# Patient Record
Sex: FEMALE | Race: WHITE | Employment: FULL TIME | URBAN - METROPOLITAN AREA
[De-identification: names, ages, dates, MRNs, and addresses within clinical notes are randomized per-mention and may not be internally consistent; named-entity substitution may affect disease eponyms.]

---

## 2017-05-09 ENCOUNTER — EMPLOYEE WELLNESS (OUTPATIENT)
Dept: OTHER | Age: 47
End: 2017-05-09

## 2017-05-09 LAB
CHOLESTEROL, TOTAL: 207 MG/DL (ref 0–199)
GLUCOSE BLD-MCNC: 96 MG/DL (ref 74–107)
HDLC SERPL-MCNC: 47 MG/DL
LDL CHOLESTEROL CALCULATED: 133 MG/DL (ref 0–99)
TRIGL SERPL-MCNC: 135 MG/DL (ref 0–149)

## 2018-03-20 VITALS — WEIGHT: 175 LBS | BODY MASS INDEX: 28.25 KG/M2

## 2018-06-08 RX ORDER — SUMATRIPTAN 100 MG/1
100 TABLET, FILM COATED ORAL EVERY 12 HOURS PRN
Qty: 9 TABLET | Refills: 0 | Status: SHIPPED | OUTPATIENT
Start: 2018-06-08

## 2018-06-08 RX ORDER — SUMATRIPTAN 100 MG/1
100 TABLET, FILM COATED ORAL
COMMUNITY
Start: 2016-05-03 | End: 2018-06-08 | Stop reason: SDUPTHER

## 2018-07-03 DIAGNOSIS — R51.9 CHRONIC NONINTRACTABLE HEADACHE, UNSPECIFIED HEADACHE TYPE: Primary | ICD-10-CM

## 2018-07-03 DIAGNOSIS — G89.29 CHRONIC NONINTRACTABLE HEADACHE, UNSPECIFIED HEADACHE TYPE: Primary | ICD-10-CM

## 2018-07-06 ENCOUNTER — HOSPITAL ENCOUNTER (OUTPATIENT)
Age: 48
Discharge: HOME | End: 2018-07-06
Payer: COMMERCIAL

## 2018-07-06 ENCOUNTER — OFFICE VISIT (OUTPATIENT)
Dept: PRIMARY CARE CLINIC | Age: 48
End: 2018-07-06
Payer: COMMERCIAL

## 2018-07-06 VITALS
BODY MASS INDEX: 28.82 KG/M2 | DIASTOLIC BLOOD PRESSURE: 72 MMHG | SYSTOLIC BLOOD PRESSURE: 126 MMHG | WEIGHT: 173 LBS | HEIGHT: 65 IN | OXYGEN SATURATION: 98 % | HEART RATE: 74 BPM | TEMPERATURE: 98.4 F

## 2018-07-06 DIAGNOSIS — G89.29 CHRONIC NONINTRACTABLE HEADACHE, UNSPECIFIED HEADACHE TYPE: ICD-10-CM

## 2018-07-06 DIAGNOSIS — G43.909 MIGRAINE WITHOUT STATUS MIGRAINOSUS, NOT INTRACTABLE, UNSPECIFIED MIGRAINE TYPE: Primary | ICD-10-CM

## 2018-07-06 DIAGNOSIS — R51.9 CHRONIC NONINTRACTABLE HEADACHE, UNSPECIFIED HEADACHE TYPE: ICD-10-CM

## 2018-07-06 DIAGNOSIS — E78.2 MIXED HYPERLIPIDEMIA: ICD-10-CM

## 2018-07-06 PROBLEM — C50.919 PRIMARY MALIGNANT NEOPLASM OF FEMALE BREAST (HCC): Status: ACTIVE | Noted: 2018-07-06

## 2018-07-06 PROBLEM — N94.9 FEMALE GENITAL SYMPTOMS: Status: ACTIVE | Noted: 2018-07-06

## 2018-07-06 LAB
ALBUMIN SERPL-MCNC: 4.5 G/DL (ref 3.5–5.2)
ALP BLD-CCNC: 59 U/L (ref 35–104)
ALT SERPL-CCNC: 14 U/L (ref 0–32)
ANION GAP SERPL CALCULATED.3IONS-SCNC: 10 MMOL/L (ref 7–16)
AST SERPL-CCNC: 15 U/L (ref 0–31)
BASOPHILS ABSOLUTE: 0.04 E9/L (ref 0–0.2)
BASOPHILS RELATIVE PERCENT: 0.8 % (ref 0–2)
BILIRUB SERPL-MCNC: 0.5 MG/DL (ref 0–1.2)
BUN BLDV-MCNC: 18 MG/DL (ref 6–20)
CALCIUM SERPL-MCNC: 9.5 MG/DL (ref 8.6–10.2)
CHLORIDE BLD-SCNC: 102 MMOL/L (ref 98–107)
CHOLESTEROL, TOTAL: 209 MG/DL (ref 0–199)
CO2: 27 MMOL/L (ref 22–29)
CREAT SERPL-MCNC: 0.9 MG/DL (ref 0.5–1)
EOSINOPHILS ABSOLUTE: 0.12 E9/L (ref 0.05–0.5)
EOSINOPHILS RELATIVE PERCENT: 2.3 % (ref 0–6)
GFR AFRICAN AMERICAN: >60
GFR NON-AFRICAN AMERICAN: >60 ML/MIN/1.73
GLUCOSE BLD-MCNC: 101 MG/DL (ref 74–109)
HCT VFR BLD CALC: 42.9 % (ref 34–48)
HDLC SERPL-MCNC: 48 MG/DL
HEMOGLOBIN: 14.5 G/DL (ref 11.5–15.5)
IMMATURE GRANULOCYTES #: 0.01 E9/L
IMMATURE GRANULOCYTES %: 0.2 % (ref 0–5)
LDL CHOLESTEROL CALCULATED: 130 MG/DL (ref 0–99)
LYMPHOCYTES ABSOLUTE: 1.42 E9/L (ref 1.5–4)
LYMPHOCYTES RELATIVE PERCENT: 27.5 % (ref 20–42)
MCH RBC QN AUTO: 30.5 PG (ref 26–35)
MCHC RBC AUTO-ENTMCNC: 33.8 % (ref 32–34.5)
MCV RBC AUTO: 90.1 FL (ref 80–99.9)
MONOCYTES ABSOLUTE: 0.38 E9/L (ref 0.1–0.95)
MONOCYTES RELATIVE PERCENT: 7.4 % (ref 2–12)
NEUTROPHILS ABSOLUTE: 3.2 E9/L (ref 1.8–7.3)
NEUTROPHILS RELATIVE PERCENT: 61.8 % (ref 43–80)
PDW BLD-RTO: 11.9 FL (ref 11.5–15)
PLATELET # BLD: 192 E9/L (ref 130–450)
PMV BLD AUTO: 11.7 FL (ref 7–12)
POTASSIUM SERPL-SCNC: 4.2 MMOL/L (ref 3.5–5)
RBC # BLD: 4.76 E12/L (ref 3.5–5.5)
SODIUM BLD-SCNC: 139 MMOL/L (ref 132–146)
TOTAL PROTEIN: 7.1 G/DL (ref 6.4–8.3)
TRIGL SERPL-MCNC: 155 MG/DL (ref 0–149)
TSH SERPL DL<=0.05 MIU/L-ACNC: 2.73 UIU/ML (ref 0.27–4.2)
VLDLC SERPL CALC-MCNC: 31 MG/DL
WBC # BLD: 5.2 E9/L (ref 4.5–11.5)

## 2018-07-06 PROCEDURE — 80061 LIPID PANEL: CPT

## 2018-07-06 PROCEDURE — 85025 COMPLETE CBC W/AUTO DIFF WBC: CPT

## 2018-07-06 PROCEDURE — 99213 OFFICE O/P EST LOW 20 MIN: CPT | Performed by: FAMILY MEDICINE

## 2018-07-06 PROCEDURE — 36415 COLL VENOUS BLD VENIPUNCTURE: CPT

## 2018-07-06 PROCEDURE — 80053 COMPREHEN METABOLIC PANEL: CPT

## 2018-07-06 PROCEDURE — 84443 ASSAY THYROID STIM HORMONE: CPT

## 2018-07-06 RX ORDER — ATORVASTATIN CALCIUM 10 MG
10 TABLET ORAL DAILY
Qty: 30 TABLET | Refills: 3 | Status: ON HOLD
Start: 2018-07-06 | End: 2020-11-18 | Stop reason: HOSPADM

## 2018-07-06 ASSESSMENT — PATIENT HEALTH QUESTIONNAIRE - PHQ9
SUM OF ALL RESPONSES TO PHQ QUESTIONS 1-9: 0
1. LITTLE INTEREST OR PLEASURE IN DOING THINGS: 0
SUM OF ALL RESPONSES TO PHQ9 QUESTIONS 1 & 2: 0
2. FEELING DOWN, DEPRESSED OR HOPELESS: 0

## 2018-07-06 ASSESSMENT — ENCOUNTER SYMPTOMS
SHORTNESS OF BREATH: 0
EYE DISCHARGE: 0
BLOOD IN STOOL: 0
HEMOPTYSIS: 0
ORTHOPNEA: 0
ABDOMINAL PAIN: 0
BLURRED VISION: 0
NAUSEA: 0

## 2019-01-25 DIAGNOSIS — Z00.00 WELL ADULT EXAM: Primary | ICD-10-CM

## 2020-11-14 ENCOUNTER — HOSPITAL ENCOUNTER (INPATIENT)
Age: 50
LOS: 5 days | Discharge: HOME | DRG: 177 | End: 2020-11-19
Attending: EMERGENCY MEDICINE | Admitting: INTERNAL MEDICINE
Payer: COMMERCIAL

## 2020-11-14 ENCOUNTER — APPOINTMENT (OUTPATIENT)
Dept: CT IMAGING | Age: 50
DRG: 177 | End: 2020-11-14
Payer: COMMERCIAL

## 2020-11-14 PROBLEM — U07.1 COVID-19: Status: ACTIVE | Noted: 2020-11-14

## 2020-11-14 LAB
ALBUMIN SERPL-MCNC: 4.1 G/DL (ref 3.5–5.2)
ALP BLD-CCNC: 112 U/L (ref 35–104)
ALT SERPL-CCNC: 69 U/L (ref 0–32)
ANION GAP SERPL CALCULATED.3IONS-SCNC: 11 MMOL/L (ref 7–16)
AST SERPL-CCNC: 93 U/L (ref 0–31)
BASOPHILS ABSOLUTE: 0 E9/L (ref 0–0.2)
BASOPHILS RELATIVE PERCENT: 0 % (ref 0–2)
BILIRUB SERPL-MCNC: 0.7 MG/DL (ref 0–1.2)
BUN BLDV-MCNC: 15 MG/DL (ref 6–20)
BURR CELLS: ABNORMAL
CALCIUM SERPL-MCNC: 8.8 MG/DL (ref 8.6–10.2)
CHLORIDE BLD-SCNC: 105 MMOL/L (ref 98–107)
CO2: 24 MMOL/L (ref 22–29)
CREAT SERPL-MCNC: 0.8 MG/DL (ref 0.5–1)
EOSINOPHILS ABSOLUTE: 0 E9/L (ref 0.05–0.5)
EOSINOPHILS RELATIVE PERCENT: 0 % (ref 0–6)
GFR AFRICAN AMERICAN: >60
GFR NON-AFRICAN AMERICAN: >60 ML/MIN/1.73
GLUCOSE BLD-MCNC: 88 MG/DL (ref 74–99)
HCG, URINE, POC: NEGATIVE
HCT VFR BLD CALC: 42.2 % (ref 34–48)
HEMOGLOBIN: 14.1 G/DL (ref 11.5–15.5)
INFLUENZA A BY PCR: NOT DETECTED
INFLUENZA B BY PCR: NOT DETECTED
LACTIC ACID, SEPSIS: 0.7 MMOL/L (ref 0.5–1.9)
LACTIC ACID, SEPSIS: 1.1 MMOL/L (ref 0.5–1.9)
LYMPHOCYTES ABSOLUTE: 0.62 E9/L (ref 1.5–4)
LYMPHOCYTES RELATIVE PERCENT: 23.5 % (ref 20–42)
Lab: NORMAL
MCH RBC QN AUTO: 29.5 PG (ref 26–35)
MCHC RBC AUTO-ENTMCNC: 33.4 % (ref 32–34.5)
MCV RBC AUTO: 88.3 FL (ref 80–99.9)
MONOCYTES ABSOLUTE: 0.16 E9/L (ref 0.1–0.95)
MONOCYTES RELATIVE PERCENT: 6.1 % (ref 2–12)
MYELOCYTE PERCENT: 0.9 % (ref 0–0)
NEGATIVE QC PASS/FAIL: NORMAL
NEUTROPHILS ABSOLUTE: 1.85 E9/L (ref 1.8–7.3)
NEUTROPHILS RELATIVE PERCENT: 69.6 % (ref 43–80)
PDW BLD-RTO: 12.7 FL (ref 11.5–15)
PLATELET # BLD: 148 E9/L (ref 130–450)
PMV BLD AUTO: 11.5 FL (ref 7–12)
POIKILOCYTES: ABNORMAL
POLYCHROMASIA: ABNORMAL
POSITIVE QC PASS/FAIL: NORMAL
POTASSIUM REFLEX MAGNESIUM: 4.5 MMOL/L (ref 3.5–5)
RBC # BLD: 4.78 E12/L (ref 3.5–5.5)
SARS-COV-2, NAAT: DETECTED
SODIUM BLD-SCNC: 140 MMOL/L (ref 132–146)
TEAR DROP CELLS: ABNORMAL
TOTAL PROTEIN: 7.6 G/DL (ref 6.4–8.3)
TROPONIN: <0.01 NG/ML (ref 0–0.03)
WBC # BLD: 2.6 E9/L (ref 4.5–11.5)

## 2020-11-14 PROCEDURE — 80053 COMPREHEN METABOLIC PANEL: CPT

## 2020-11-14 PROCEDURE — 84484 ASSAY OF TROPONIN QUANT: CPT

## 2020-11-14 PROCEDURE — U0002 COVID-19 LAB TEST NON-CDC: HCPCS

## 2020-11-14 PROCEDURE — 6370000000 HC RX 637 (ALT 250 FOR IP): Performed by: EMERGENCY MEDICINE

## 2020-11-14 PROCEDURE — 6360000004 HC RX CONTRAST MEDICATION: Performed by: RADIOLOGY

## 2020-11-14 PROCEDURE — 2500000003 HC RX 250 WO HCPCS: Performed by: SPECIALIST

## 2020-11-14 PROCEDURE — 83605 ASSAY OF LACTIC ACID: CPT

## 2020-11-14 PROCEDURE — 36415 COLL VENOUS BLD VENIPUNCTURE: CPT

## 2020-11-14 PROCEDURE — 82728 ASSAY OF FERRITIN: CPT

## 2020-11-14 PROCEDURE — 87040 BLOOD CULTURE FOR BACTERIA: CPT

## 2020-11-14 PROCEDURE — 2580000003 HC RX 258: Performed by: SPECIALIST

## 2020-11-14 PROCEDURE — 85384 FIBRINOGEN ACTIVITY: CPT

## 2020-11-14 PROCEDURE — XW033E5 INTRODUCTION OF REMDESIVIR ANTI-INFECTIVE INTO PERIPHERAL VEIN, PERCUTANEOUS APPROACH, NEW TECHNOLOGY GROUP 5: ICD-10-PCS | Performed by: INTERNAL MEDICINE

## 2020-11-14 PROCEDURE — 6360000002 HC RX W HCPCS: Performed by: INTERNAL MEDICINE

## 2020-11-14 PROCEDURE — 6360000002 HC RX W HCPCS: Performed by: EMERGENCY MEDICINE

## 2020-11-14 PROCEDURE — 71275 CT ANGIOGRAPHY CHEST: CPT

## 2020-11-14 PROCEDURE — 6360000002 HC RX W HCPCS: Performed by: SPECIALIST

## 2020-11-14 PROCEDURE — 85025 COMPLETE CBC W/AUTO DIFF WBC: CPT

## 2020-11-14 PROCEDURE — 86140 C-REACTIVE PROTEIN: CPT

## 2020-11-14 PROCEDURE — 87502 INFLUENZA DNA AMP PROBE: CPT

## 2020-11-14 PROCEDURE — 84145 PROCALCITONIN (PCT): CPT

## 2020-11-14 PROCEDURE — 1200000000 HC SEMI PRIVATE

## 2020-11-14 PROCEDURE — 85610 PROTHROMBIN TIME: CPT

## 2020-11-14 PROCEDURE — 99284 EMERGENCY DEPT VISIT MOD MDM: CPT

## 2020-11-14 PROCEDURE — 93005 ELECTROCARDIOGRAM TRACING: CPT | Performed by: EMERGENCY MEDICINE

## 2020-11-14 PROCEDURE — 2580000003 HC RX 258: Performed by: EMERGENCY MEDICINE

## 2020-11-14 PROCEDURE — 83615 LACTATE (LD) (LDH) ENZYME: CPT

## 2020-11-14 RX ORDER — DEXAMETHASONE 4 MG/1
4 TABLET ORAL EVERY 6 HOURS SCHEDULED
Status: DISCONTINUED | OUTPATIENT
Start: 2020-11-15 | End: 2020-11-14

## 2020-11-14 RX ORDER — 0.9 % SODIUM CHLORIDE 0.9 %
1000 INTRAVENOUS SOLUTION INTRAVENOUS ONCE
Status: COMPLETED | OUTPATIENT
Start: 2020-11-14 | End: 2020-11-14

## 2020-11-14 RX ORDER — ACETAMINOPHEN 325 MG/1
650 TABLET ORAL EVERY 4 HOURS PRN
Status: DISCONTINUED | OUTPATIENT
Start: 2020-11-14 | End: 2020-11-19 | Stop reason: HOSPADM

## 2020-11-14 RX ORDER — KETOROLAC TROMETHAMINE 30 MG/ML
30 INJECTION, SOLUTION INTRAMUSCULAR; INTRAVENOUS ONCE
Status: COMPLETED | OUTPATIENT
Start: 2020-11-14 | End: 2020-11-14

## 2020-11-14 RX ORDER — DEXAMETHASONE 6 MG/1
6 TABLET ORAL DAILY
Status: DISCONTINUED | OUTPATIENT
Start: 2020-11-15 | End: 2020-11-17 | Stop reason: CLARIF

## 2020-11-14 RX ORDER — DEXTROSE, SODIUM CHLORIDE, AND POTASSIUM CHLORIDE 5; .45; .15 G/100ML; G/100ML; G/100ML
INJECTION INTRAVENOUS CONTINUOUS
Status: DISCONTINUED | OUTPATIENT
Start: 2020-11-14 | End: 2020-11-15

## 2020-11-14 RX ORDER — 0.9 % SODIUM CHLORIDE 0.9 %
30 INTRAVENOUS SOLUTION INTRAVENOUS PRN
Status: DISCONTINUED | OUTPATIENT
Start: 2020-11-14 | End: 2020-11-19 | Stop reason: HOSPADM

## 2020-11-14 RX ORDER — ASPIRIN 81 MG/1
324 TABLET, CHEWABLE ORAL ONCE
Status: COMPLETED | OUTPATIENT
Start: 2020-11-14 | End: 2020-11-14

## 2020-11-14 RX ORDER — ASCORBIC ACID 500 MG
500 TABLET ORAL DAILY
Status: DISCONTINUED | OUTPATIENT
Start: 2020-11-15 | End: 2020-11-19 | Stop reason: HOSPADM

## 2020-11-14 RX ORDER — BUDESONIDE AND FORMOTEROL FUMARATE DIHYDRATE 80; 4.5 UG/1; UG/1
2 AEROSOL RESPIRATORY (INHALATION) 2 TIMES DAILY
Status: DISCONTINUED | OUTPATIENT
Start: 2020-11-14 | End: 2020-11-15 | Stop reason: CLARIF

## 2020-11-14 RX ORDER — ZINC GLUCONATE 50 MG
50 TABLET ORAL DAILY
Status: DISCONTINUED | OUTPATIENT
Start: 2020-11-15 | End: 2020-11-19 | Stop reason: HOSPADM

## 2020-11-14 RX ORDER — ACETAMINOPHEN 500 MG
1000 TABLET ORAL ONCE
Status: COMPLETED | OUTPATIENT
Start: 2020-11-14 | End: 2020-11-14

## 2020-11-14 RX ADMIN — IOPAMIDOL 75 ML: 755 INJECTION, SOLUTION INTRAVENOUS at 17:45

## 2020-11-14 RX ADMIN — REMDESIVIR 200 MG: 100 INJECTION, POWDER, LYOPHILIZED, FOR SOLUTION INTRAVENOUS at 23:53

## 2020-11-14 RX ADMIN — ACETAMINOPHEN 1000 MG: 500 TABLET, FILM COATED ORAL at 20:00

## 2020-11-14 RX ADMIN — ASPIRIN 324 MG: 81 TABLET, CHEWABLE ORAL at 16:18

## 2020-11-14 RX ADMIN — SODIUM CHLORIDE 1000 ML: 9 INJECTION, SOLUTION INTRAVENOUS at 16:19

## 2020-11-14 RX ADMIN — DEXAMETHASONE 6 MG: 4 TABLET ORAL at 20:00

## 2020-11-14 RX ADMIN — KETOROLAC TROMETHAMINE 30 MG: 30 INJECTION, SOLUTION INTRAMUSCULAR; INTRAVENOUS at 20:27

## 2020-11-14 RX ADMIN — DEXAMETHASONE 4 MG: 4 TABLET ORAL at 23:53

## 2020-11-14 RX ADMIN — WATER 2 G: 1 INJECTION INTRAMUSCULAR; INTRAVENOUS; SUBCUTANEOUS at 23:52

## 2020-11-14 ASSESSMENT — PAIN SCALES - GENERAL
PAINLEVEL_OUTOF10: 4
PAINLEVEL_OUTOF10: 6
PAINLEVEL_OUTOF10: 0
PAINLEVEL_OUTOF10: 6

## 2020-11-14 NOTE — ED PROVIDER NOTES
HPI:  11/14/20, Time: 3:26 PM ZAHIDA Jackson is a 51 y.o. female presenting to the ED for chest pressure, shortness of breath, cough, diarrhea, myalgias, headache, beginning 2 day ago. The complaint has been persistent, moderate in severity, and worsened by nothing. Patient has a pulse ox at home and has been monitoring her O2 levels. They've been dropping to around 86%. She contacted her PCP who recommended she come to the ED for evaluation and treatment. Patient denies any sick contacts. Patient describes her chest pain as a heaviness/tight sensation across her chest.  No radiation. No alleviating or exacerbating factors. She denies a history of coronary artery disease, hypertension, hyperlipidemia, family history of coronary artery disease, smoking. She states that she has a history of breast cancer. She denies any history of PE, DVT, calf pain, calf swelling, hormone use. ROS:   Pertinent positives and negatives are stated within HPI, all other systems reviewed and are negative.  --------------------------------------------- PAST HISTORY ---------------------------------------------  Past Medical History:  has a past medical history of Cancer ([facility] [address] and Headache. Past Surgical History:  has a past surgical history that includes Mastectomy, radical (2006); Appendectomy; Breast lumpectomy; hysteroscopy (june 2013); and Endometrial ablation (06/28/2013). Social History:  reports that she has never smoked. She has never used smokeless tobacco. She reports current alcohol use. She reports that she does not use drugs. Family History: family history includes Breast Cancer in her mother; Cancer in her father; Diabetes in her father; Glaucoma in her father. The patients home medications have been reviewed.     Allergies: No known allergies    ---------------------------------------------------PHYSICAL EXAM--------------------------------------    Constitutional/General: Alert and oriented x3, well appearing, non toxic in NAD  Head: Normocephalic and atraumatic  Eyes: PERRL, EOMI  Mouth: Oropharynx clear, handling secretions, no trismus  Neck: Supple, full ROM, non tender to palpation in the midline, no stridor, no crepitus, no meningeal signs  Pulmonary: Lungs clear to auscultation bilaterally, no wheezes, rales, or rhonchi. Not in respiratory distress  Cardiovascular:  Regular rate. Regular rhythm. No murmurs, gallops, or rubs. 2+ distal pulses  Chest: no chest wall tenderness  Abdomen: Soft. Non tender. Non distended. +BS. No rebound, guarding, or rigidity. No pulsatile masses appreciated. Musculoskeletal: Moves all extremities x 4. Warm and well perfused, no clubbing, cyanosis, or edema. Capillary refill <3 seconds  Skin: warm and dry. No rashes. Neurologic: GCS 15, CN 2-12 grossly intact, no focal deficits, symmetric strength 5/5 in the upper and lower extremities bilaterally  Psych: Normal Affect    -------------------------------------------------- RESULTS -------------------------------------------------  I have personally reviewed all laboratory and imaging results for this patient. Results are listed below.      LABS:  Results for orders placed or performed during the hospital encounter of 11/14/20   Rapid influenza A/B antigens    Specimen: Nares   Result Value Ref Range    Influenza A by PCR Not Detected Not Detected    Influenza B by PCR Not Detected Not Detected   CBC Auto Differential   Result Value Ref Range    WBC 2.6 (L) 4.5 - 11.5 E9/L    RBC 4.78 3.50 - 5.50 E12/L    Hemoglobin 14.1 11.5 - 15.5 g/dL    Hematocrit 42.2 34.0 - 48.0 %    MCV 88.3 80.0 - 99.9 fL    MCH 29.5 26.0 - 35.0 pg    MCHC 33.4 32.0 - 34.5 %    RDW 12.7 11.5 - 15.0 fL    Platelets 443 139 - 793 E9/L    MPV 11.5 7.0 - 12.0 fL    Neutrophils % 69.6 43.0 - 80.0 %    Lymphocytes % 23.5 20.0 - 42.0 %    Monocytes % 6.1 2.0 - 12.0 %    Eosinophils % 0.0 0.0 - 6.0 %    Basophils % 0.0 0.0 - 2.0 % Commonly reported imaging features of COVID-19 pneumonia are present. Other   processes such as influenza pneumonia and organizing pneumonia, as can be   seen with drug toxicity and connective tissue disease, can cause a similar   imaging pattern. A follow-up chest CT 3-6 months after resolution of   patient's symptoms suggested to reassess lung parenchyma. 3.  Status post left mastectomy. No evidence of destructive lytic or blastic   abnormality. EKG Interpretation  Interpreted by emergency department physician    Rhythm: normal sinus   Rate: normal  Axis: normal  Conduction: normal  ST Segments: no acute change  T Waves: no acute change    Clinical Impression: no acute changes  Comparison to prior EKG: stable as compared to patient's most recent EKG and None      ------------------------- NURSING NOTES AND VITALS REVIEWED ---------------------------   The nursing notes within the ED encounter and vital signs as below have been reviewed by myself. /71   Pulse 67   Temp 98.6 °F (37 °C) (Oral)   Resp 19   Ht 5' 6\" (1.676 m)   Wt 170 lb (77.1 kg)   LMP 11/14/2013   SpO2 96%   BMI 27.44 kg/m²   Oxygen Saturation Interpretation: Normal    The patients available past medical records and past encounters were reviewed. ------------------------------ ED COURSE/MEDICAL DECISION MAKING----------------------  Medications   0.9 % sodium chloride bolus (0 mLs Intravenous Stopped 11/14/20 2002)   aspirin chewable tablet 324 mg (324 mg Oral Given 11/14/20 1618)   iopamidol (ISOVUE-370) 76 % injection 75 mL (75 mLs Intravenous Given 11/14/20 1745)   acetaminophen (TYLENOL) tablet 1,000 mg (1,000 mg Oral Given 11/14/20 2000)   dexamethasone (DECADRON) tablet 6 mg (6 mg Oral Given 11/14/20 2000)   ketorolac (TORADOL) injection 30 mg (30 mg Intravenous Given 11/14/20 2027)             Medical Decision Making:    Vital signs are remarkable for hypoxia. Patient dips into the mid 80s. Patient placed on 2 L nasal cannula and is now at 99%. Labs and imaging obtained. Patient's white blood cell count is 2.6. She has elevated liver enzymes. Troponin is negative. Influenza was negative. CTA of the chest shows diffuse ground glass opacities. Patient would like to be admitted to Dr. Felicia Ho. He is agreeable with admitting the patient. Would like infectious disease consulted. I will start patient on decadron. Re-Evaluations:             Re-evaluation. Patients symptoms show no change          This patient's ED course included: a personal history and physicial examination, re-evaluation prior to disposition, multiple bedside re-evaluations, cardiac monitoring, continuous pulse oximetry and a personal history and physicial eaxmination    This patient has remained hemodynamically stable during their ED course. Counseling: The emergency provider has spoken with the patient and discussed todays results, in addition to providing specific details for the plan of care and counseling regarding the diagnosis and prognosis. Questions are answered at this time and they are agreeable with the plan.       --------------------------------- IMPRESSION AND DISPOSITION ---------------------------------    IMPRESSION  1. COVID-19        DISPOSITION  Disposition: Admit to telemetry  Patient condition is stable        NOTE: This report was transcribed using voice recognition software.  Every effort was made to ensure accuracy; however, inadvertent computerized transcription errors may be present          Reena Dimas MD  11/14/20 3669

## 2020-11-14 NOTE — ED NOTES
Pt declined covid 19 test Dr Cliffton Meckel and Pt PCP Dr Jovanni Tapia aware     Marybel Streeter, RN  11/14/20 4630

## 2020-11-15 LAB
ALBUMIN SERPL-MCNC: 3.4 G/DL (ref 3.5–5.2)
ALP BLD-CCNC: 109 U/L (ref 35–104)
ALT SERPL-CCNC: 61 U/L (ref 0–32)
ANION GAP SERPL CALCULATED.3IONS-SCNC: 9 MMOL/L (ref 7–16)
AST SERPL-CCNC: 64 U/L (ref 0–31)
BASOPHILS ABSOLUTE: 0 E9/L (ref 0–0.2)
BASOPHILS RELATIVE PERCENT: 0 % (ref 0–2)
BILIRUB SERPL-MCNC: 0.4 MG/DL (ref 0–1.2)
BUN BLDV-MCNC: 13 MG/DL (ref 6–20)
C-REACTIVE PROTEIN: 2 MG/DL (ref 0–0.4)
CALCIUM SERPL-MCNC: 8 MG/DL (ref 8.6–10.2)
CHLORIDE BLD-SCNC: 107 MMOL/L (ref 98–107)
CO2: 21 MMOL/L (ref 22–29)
CREAT SERPL-MCNC: 0.6 MG/DL (ref 0.5–1)
D DIMER: <200 NG/ML DDU
EKG ATRIAL RATE: 82 BPM
EKG P AXIS: 60 DEGREES
EKG P-R INTERVAL: 156 MS
EKG Q-T INTERVAL: 392 MS
EKG QRS DURATION: 94 MS
EKG QTC CALCULATION (BAZETT): 457 MS
EKG R AXIS: 21 DEGREES
EKG T AXIS: 53 DEGREES
EKG VENTRICULAR RATE: 82 BPM
EOSINOPHILS ABSOLUTE: 0 E9/L (ref 0.05–0.5)
EOSINOPHILS RELATIVE PERCENT: 0 % (ref 0–6)
FERRITIN: 796 NG/ML
FIBRINOGEN: 627 MG/DL (ref 225–540)
GFR AFRICAN AMERICAN: >60
GFR NON-AFRICAN AMERICAN: >60 ML/MIN/1.73
GLUCOSE BLD-MCNC: 211 MG/DL (ref 74–99)
HCT VFR BLD CALC: 39.2 % (ref 34–48)
HEMOGLOBIN: 13.5 G/DL (ref 11.5–15.5)
INR BLD: 1.1
LACTATE DEHYDROGENASE: 356 U/L (ref 135–214)
LYMPHOCYTES ABSOLUTE: 0.26 E9/L (ref 1.5–4)
LYMPHOCYTES RELATIVE PERCENT: 20 % (ref 20–42)
MCH RBC QN AUTO: 29.6 PG (ref 26–35)
MCHC RBC AUTO-ENTMCNC: 34.4 % (ref 32–34.5)
MCV RBC AUTO: 86 FL (ref 80–99.9)
MONOCYTES ABSOLUTE: 0.08 E9/L (ref 0.1–0.95)
MONOCYTES RELATIVE PERCENT: 6.1 % (ref 2–12)
NEUTROPHILS ABSOLUTE: 0.96 E9/L (ref 1.8–7.3)
NEUTROPHILS RELATIVE PERCENT: 73.9 % (ref 43–80)
PDW BLD-RTO: 12.5 FL (ref 11.5–15)
PLATELET # BLD: 156 E9/L (ref 130–450)
PMV BLD AUTO: 10.8 FL (ref 7–12)
POTASSIUM SERPL-SCNC: 4.1 MMOL/L (ref 3.5–5)
PROCALCITONIN: 0.06 NG/ML (ref 0–0.08)
PROTHROMBIN TIME: 12.1 SEC (ref 9.3–12.4)
RBC # BLD: 4.56 E12/L (ref 3.5–5.5)
REASON FOR REJECTION: NORMAL
REJECTED TEST: NORMAL
SODIUM BLD-SCNC: 137 MMOL/L (ref 132–146)
TOTAL PROTEIN: 6.3 G/DL (ref 6.4–8.3)
WBC # BLD: 1.3 E9/L (ref 4.5–11.5)

## 2020-11-15 PROCEDURE — 82306 VITAMIN D 25 HYDROXY: CPT

## 2020-11-15 PROCEDURE — 6370000000 HC RX 637 (ALT 250 FOR IP): Performed by: INTERNAL MEDICINE

## 2020-11-15 PROCEDURE — 1200000000 HC SEMI PRIVATE

## 2020-11-15 PROCEDURE — 6370000000 HC RX 637 (ALT 250 FOR IP): Performed by: SPECIALIST

## 2020-11-15 PROCEDURE — 2500000003 HC RX 250 WO HCPCS: Performed by: INTERNAL MEDICINE

## 2020-11-15 PROCEDURE — 36415 COLL VENOUS BLD VENIPUNCTURE: CPT

## 2020-11-15 PROCEDURE — 94640 AIRWAY INHALATION TREATMENT: CPT

## 2020-11-15 PROCEDURE — 6360000002 HC RX W HCPCS: Performed by: INTERNAL MEDICINE

## 2020-11-15 PROCEDURE — 93010 ELECTROCARDIOGRAM REPORT: CPT | Performed by: INTERNAL MEDICINE

## 2020-11-15 PROCEDURE — 85378 FIBRIN DEGRADE SEMIQUANT: CPT

## 2020-11-15 PROCEDURE — 2580000003 HC RX 258: Performed by: SPECIALIST

## 2020-11-15 PROCEDURE — 6360000002 HC RX W HCPCS: Performed by: SPECIALIST

## 2020-11-15 PROCEDURE — 94761 N-INVAS EAR/PLS OXIMETRY MLT: CPT

## 2020-11-15 PROCEDURE — 85025 COMPLETE CBC W/AUTO DIFF WBC: CPT

## 2020-11-15 PROCEDURE — 80053 COMPREHEN METABOLIC PANEL: CPT

## 2020-11-15 RX ORDER — TRAMADOL HYDROCHLORIDE 50 MG/1
100 TABLET ORAL EVERY 6 HOURS PRN
Status: DISCONTINUED | OUTPATIENT
Start: 2020-11-15 | End: 2020-11-19 | Stop reason: HOSPADM

## 2020-11-15 RX ORDER — THIAMINE MONONITRATE (VIT B1) 100 MG
100 TABLET ORAL DAILY
Status: DISCONTINUED | OUTPATIENT
Start: 2020-11-15 | End: 2020-11-19 | Stop reason: HOSPADM

## 2020-11-15 RX ORDER — PSEUDOEPHEDRINE HCL 120 MG/1
240 TABLET, FILM COATED, EXTENDED RELEASE ORAL DAILY
Status: DISCONTINUED | OUTPATIENT
Start: 2020-11-16 | End: 2020-11-16

## 2020-11-15 RX ORDER — DEXTROSE, SODIUM CHLORIDE, AND POTASSIUM CHLORIDE 5; .45; .15 G/100ML; G/100ML; G/100ML
INJECTION INTRAVENOUS ONCE
Status: COMPLETED | OUTPATIENT
Start: 2020-11-15 | End: 2020-11-15

## 2020-11-15 RX ORDER — CETIRIZINE HYDROCHLORIDE 10 MG/1
10 TABLET ORAL DAILY
Status: DISCONTINUED | OUTPATIENT
Start: 2020-11-16 | End: 2020-11-16

## 2020-11-15 RX ORDER — TRAMADOL HYDROCHLORIDE 50 MG/1
50 TABLET ORAL EVERY 6 HOURS PRN
Status: DISCONTINUED | OUTPATIENT
Start: 2020-11-15 | End: 2020-11-15

## 2020-11-15 RX ORDER — FEXOFENADINE HCL AND PSEUDOEPHEDRINE HCI 180; 240 MG/1; MG/1
1 TABLET, EXTENDED RELEASE ORAL DAILY
Status: DISCONTINUED | OUTPATIENT
Start: 2020-11-15 | End: 2020-11-15 | Stop reason: CLARIF

## 2020-11-15 RX ADMIN — TRAMADOL HYDROCHLORIDE 50 MG: 50 TABLET, FILM COATED ORAL at 12:06

## 2020-11-15 RX ADMIN — ACETAMINOPHEN 650 MG: 325 TABLET ORAL at 03:02

## 2020-11-15 RX ADMIN — OXYCODONE HYDROCHLORIDE AND ACETAMINOPHEN 500 MG: 500 TABLET ORAL at 08:29

## 2020-11-15 RX ADMIN — THIAMINE HCL TAB 100 MG 100 MG: 100 TAB at 20:29

## 2020-11-15 RX ADMIN — POTASSIUM CHLORIDE, DEXTROSE MONOHYDRATE AND SODIUM CHLORIDE: 150; 5; 450 INJECTION, SOLUTION INTRAVENOUS at 20:29

## 2020-11-15 RX ADMIN — AZITHROMYCIN DIHYDRATE 500 MG: 500 INJECTION, POWDER, LYOPHILIZED, FOR SOLUTION INTRAVENOUS at 01:31

## 2020-11-15 RX ADMIN — Medication 50 MG: at 08:29

## 2020-11-15 RX ADMIN — MOMETASONE FUROATE AND FORMOTEROL FUMARATE DIHYDRATE 2 PUFF: 100; 5 AEROSOL RESPIRATORY (INHALATION) at 12:45

## 2020-11-15 RX ADMIN — ACETAMINOPHEN 650 MG: 325 TABLET ORAL at 08:30

## 2020-11-15 RX ADMIN — ENOXAPARIN SODIUM 40 MG: 40 INJECTION SUBCUTANEOUS at 20:29

## 2020-11-15 RX ADMIN — ENOXAPARIN SODIUM 40 MG: 40 INJECTION SUBCUTANEOUS at 08:34

## 2020-11-15 RX ADMIN — WATER 2 G: 1 INJECTION INTRAMUSCULAR; INTRAVENOUS; SUBCUTANEOUS at 22:49

## 2020-11-15 RX ADMIN — TRAMADOL HYDROCHLORIDE 100 MG: 50 TABLET, FILM COATED ORAL at 20:19

## 2020-11-15 RX ADMIN — MOMETASONE FUROATE AND FORMOTEROL FUMARATE DIHYDRATE 2 PUFF: 100; 5 AEROSOL RESPIRATORY (INHALATION) at 19:11

## 2020-11-15 RX ADMIN — DEXAMETHASONE 6 MG: 4 TABLET ORAL at 08:29

## 2020-11-15 RX ADMIN — AZITHROMYCIN DIHYDRATE 500 MG: 500 INJECTION, POWDER, LYOPHILIZED, FOR SOLUTION INTRAVENOUS at 22:48

## 2020-11-15 RX ADMIN — POTASSIUM CHLORIDE, DEXTROSE MONOHYDRATE AND SODIUM CHLORIDE: 150; 5; 450 INJECTION, SOLUTION INTRAVENOUS at 01:31

## 2020-11-15 ASSESSMENT — PAIN DESCRIPTION - PAIN TYPE
TYPE: ACUTE PAIN
TYPE: ACUTE PAIN

## 2020-11-15 ASSESSMENT — PAIN DESCRIPTION - DESCRIPTORS
DESCRIPTORS: ACHING;DISCOMFORT;HEADACHE
DESCRIPTORS: ACHING;DISCOMFORT;HEADACHE

## 2020-11-15 ASSESSMENT — PAIN DESCRIPTION - LOCATION
LOCATION: HEAD
LOCATION: HEAD

## 2020-11-15 ASSESSMENT — PAIN SCALES - GENERAL
PAINLEVEL_OUTOF10: 0
PAINLEVEL_OUTOF10: 4
PAINLEVEL_OUTOF10: 10
PAINLEVEL_OUTOF10: 8

## 2020-11-15 NOTE — PLAN OF CARE
Problem: Airway Clearance - Ineffective  Goal: Achieve or maintain patent airway  11/15/2020 0240 by Felicitas Echols RN  Outcome: Met This Shift  11/15/2020 0240 by Felicitas Echols RN  Outcome: Met This Shift     Problem: Gas Exchange - Impaired  Goal: Absence of hypoxia  11/15/2020 0240 by Felicitas Echols RN  Outcome: Met This Shift  11/15/2020 0240 by Felicitas Echols RN  Outcome: Met This Shift  Goal: Promote optimal lung function  11/15/2020 0240 by Felicitas Echols RN  Outcome: Met This Shift  11/15/2020 0240 by Felicitas Echols RN  Outcome: Met This Shift     Problem: Breathing Pattern - Ineffective  Goal: Ability to achieve and maintain a regular respiratory rate  11/15/2020 0240 by Felicitas Echols RN  Outcome: Met This Shift  11/15/2020 0240 by Felicitas Echols RN  Outcome: Met This Shift     Problem:  Body Temperature -  Risk of, Imbalanced  Goal: Ability to maintain a body temperature within defined limits  11/15/2020 0240 by Felicitas Echols RN  Outcome: Met This Shift  11/15/2020 0240 by Felicitas Echols RN  Outcome: Met This Shift  Goal: Will regain or maintain usual level of consciousness  11/15/2020 0240 by Felicitas Echols RN  Outcome: Met This Shift  11/15/2020 0240 by Felicitas Echols RN  Outcome: Met This Shift  Goal: Complications related to the disease process, condition or treatment will be avoided or minimized  11/15/2020 0240 by Felicitas Echols RN  Outcome: Met This Shift  11/15/2020 0240 by Felicitas Echols RN  Outcome: Met This Shift     Problem: Isolation Precautions - Risk of Spread of Infection  Goal: Prevent transmission of infection  11/15/2020 0240 by Felicitas Echols RN  Outcome: Met This Shift  11/15/2020 0240 by Felicitas Echols RN  Outcome: Met This Shift     Problem: Nutrition Deficits  Goal: Optimize nutrtional status  11/15/2020 0240 by Felicitas Echols RN  Outcome: Met This Shift  11/15/2020 0240 by Felicitas Echols RN  Outcome: Met This Shift     Problem: Risk for Fluid Volume Deficit  Goal: Maintain normal heart rhythm  11/15/2020 0240 by Mariaelena Shook RN  Outcome: Met This Shift  11/15/2020 0240 by Mariaelena Shook RN  Outcome: Met This Shift  Goal: Maintain absence of muscle cramping  11/15/2020 0240 by Mariaelena Shook RN  Outcome: Met This Shift  11/15/2020 0240 by Mariaelena Shook RN  Outcome: Met This Shift  Goal: Maintain normal serum potassium, sodium, calcium, phosphorus, and pH  11/15/2020 0240 by Mariaelena Shook RN  Outcome: Met This Shift  11/15/2020 0240 by Mariaelena Shook RN  Outcome: Met This Shift     Problem: Loneliness or Risk for Loneliness  Goal: Demonstrate positive use of time alone when socialization is not possible  11/15/2020 0240 by Mariaelena Shook RN  Outcome: Met This Shift  11/15/2020 0240 by Mariaelena Shook RN  Outcome: Met This Shift     Problem: Fatigue  Goal: Verbalize increase energy and improved vitality  11/15/2020 0240 by Mariaelena Shook RN  Outcome: Met This Shift  11/15/2020 0240 by Mariaelena Shook RN  Outcome: Met This Shift     Problem: Patient Education: Go to Patient Education Activity  Goal: Patient/Family Education  11/15/2020 0240 by Mariaelena Shook RN  Outcome: Met This Shift  11/15/2020 0240 by Mariaelena Shook RN  Outcome: Met This Shift     Problem: Pain:  Goal: Pain level will decrease  Description: Pain level will decrease  11/15/2020 0240 by Mariaelena Shook RN  Outcome: Met This Shift  11/15/2020 0240 by Mariaelena Shook RN  Outcome: Met This Shift  Goal: Control of acute pain  Description: Control of acute pain  11/15/2020 0240 by Mariaelena Shook RN  Outcome: Met This Shift  11/15/2020 0240 by Mariaelena Shook RN  Outcome: Met This Shift  Goal: Control of chronic pain  Description: Control of chronic pain  11/15/2020 0240 by Mariaelena Shook RN  Outcome: Met This Shift  11/15/2020 0240 by Mariaelena Shook RN  Outcome: Met This Shift

## 2020-11-15 NOTE — H&P
History and Physical      CHIEF COMPLAINT:  Influenza (coughing since thursday with shortness of breath and pulse ox 86%, no n/v, diarrhea, bodyaches, headache)    History Obtained From:  patient, spouse, electronic medical record    HISTORY OF PRESENT ILLNESS:    The patient is a 51 y.o. female who presents with coughing along with shortness of breath worsening over the past 3 to 4 days. On the day of admission patient's pulse ox was in the mid to high 80% range at rest and with any exertion so she was advised to come to the emergency room. She did have associated low-grade temperatures but no nausea, vomiting, diarrhea or myalgias. She is experiencing loss of taste and smell though. CT chest found groundglass opacities consistent with Covid pneumonia. Respiratory swab confirmed patient was positive for Covid requiring admission to the Covid unit for further treatment. Past Medical History:    Past Medical History:   Diagnosis Date    Cancer [facility])     left breast    Headache      Past Surgical History:    Past Surgical History:   Procedure Laterality Date    APPENDECTOMY      BREAST LUMPECTOMY      mastectomy-- left    ENDOMETRIAL ABLATION  06/28/2013    HYSTEROSCOPY  june 2013    d and c  and endometrial ablation    MASTECTOMY, RADICAL  2006    left     Medications Prior to Admission:    Medications Prior to Admission: LIPITOR 10 MG tablet, Take 1 tablet by mouth daily  SUMAtriptan (IMITREX) 100 MG tablet, Take 1 tablet by mouth every 12 hours as needed for Migraine    Allergies:    No known allergies    Social History:    reports that she has never smoked. She has never used smokeless tobacco. She reports current alcohol use. She reports that she does not use drugs.     Family History:   family history includes Breast Cancer in her mother; Cancer in her father; Diabetes in her father; Glaucoma in her father. Review of Systems  Please see HPI above. All bolded are positive.  All un-bolded are negative. Gen: fever, chills, fatigue, weakness, diaphoresis, increase in thirst, unintentional weight change, loss of appetite  Head: headache, vision change, hearing loss  Chest: chest pain, chest heaviness, palpitations, orthopnea, PND, KELLER  Lungs: shortness of breath, wheezing, coughing  Abdomen: abdominal pain, nausea, vomiting, diarrhea, constipation, melena, hematochezia, hematemesis  Extremities: lower extremity edema, myalgias, arthralgias  Urinary: dysuria, hematuria, or increase in frequency  Neurologic: lightheadedness, dizziness, confusion, syncope  Psychiatric: depression, suicidal ideation, or anxiety    PHYSICAL EXAM:  Vitals:  /70   Pulse 67   Temp 98.1 °F (36.7 °C) (Infrared)   Resp 18   Ht 5' 6\" (1.676 m)   Wt 170 lb (77.1 kg)   LMP 11/14/2013   SpO2 94%   BMI 27.44 kg/m²     General:  Awake, alert, oriented X 3. Well developed, well nourished, well groomed. In mild respiratory distress   HEENT:  Normocephalic, atraumatic. Pupils equal, round, reactive to light. No scleral icterus. No conjunctival injection. Normal lips, teeth, and gums. No nasal discharge. Neck:  Supple  Heart:  RRR, pos S1S2  Lungs: Diminished throughout with no wheeze, rales, or rhonchi  Abdomen:   Bowel sounds present, soft, nontender, no peritoneal signs  Extremities:  No clubbing, cyanosis, or edema  Skin:  Warm and dry, no open lesions or rash  Neuro:  Cranial nerves 2-12 intact, no focal deficits  Breast: deferred  Rectal: deferred  Genitalia:  deferred    LABS:  Lab Results   Component Value Date    WBC 2.6 11/14/2020    RBC 4.78 11/14/2020    HGB 14.1 11/14/2020    HCT 42.2 11/14/2020    MCV 88.3 11/14/2020    MCH 29.5 11/14/2020    MCHC 33.4 11/14/2020    RDW 12.7 11/14/2020     11/14/2020    MPV 11.5 11/14/2020     Lab Results   Component Value Date     11/14/2020    K 4.5 11/14/2020     11/14/2020    CO2 24 11/14/2020    BUN 15 11/14/2020    CREATININE 0.8 11/14/2020 GFRAA >60 11/14/2020    LABGLOM >60 11/14/2020    GLUCOSE 88 11/14/2020    PROT 7.6 11/14/2020    LABALBU 4.1 11/14/2020    CALCIUM 8.8 11/14/2020    BILITOT 0.7 11/14/2020    ALKPHOS 112 11/14/2020    AST 93 11/14/2020    ALT 69 11/14/2020     No results found for: PROTIME, INR  Recent Labs     11/14/20  1551   TROPONINI <0.01     Lab Results   Component Value Date    NITRU Negative 06/30/2016    COLORU Yellow 06/30/2016    PHUR 5.5 06/30/2016    CLARITYU Clear 06/30/2016    SPECGRAV >=1.030 06/30/2016    LEUKOCYTESUR Negative 06/30/2016    UROBILINOGEN 0.2 06/30/2016    BILIRUBINUR Negative 06/30/2016    BLOODU Negative 06/30/2016    GLUCOSEU Negative 06/30/2016    KETUA Negative 06/30/2016     No results found for: MG, PHOS  No results found for: LABA1C  Lab Results   Component Value Date    TSH 2.730 07/06/2018     Lab Results   Component Value Date    TRIG 155 07/06/2018    HDL 48 07/06/2018    LDLCALC 130 07/06/2018    LABVLDL 31 07/06/2018       Radiology  Cta Chest W Contrast    Result Date: 11/14/2020  EXAMINATION: CTA OF THE CHEST 11/14/2020 5:45 pm TECHNIQUE: CTA of the chest was performed after the administration of intravenous contrast.  Multiplanar reformatted images are provided for review. MIP images are provided for review. Dose modulation, iterative reconstruction, and/or weight based adjustment of the mA/kV was utilized to reduce the radiation dose to as low as reasonably achievable. COMPARISON: CT chest from November 12, 2015 HISTORY: ORDERING SYSTEM PROVIDED HISTORY: hypoxia. History of breast cancer TECHNOLOGIST PROVIDED HISTORY: Reason for exam:->hypoxia. History of breast cancer FINDINGS: Pulmonary Arteries: Adequate opacification of the pulmonary arterial system to exclude central and segmental pulmonary emboli. Mediastinum: No evidence of mediastinal lymphadenopathy. The heart and pericardium demonstrate no acute abnormality. There is no acute abnormality of the thoracic aorta.   Normal course and appearance of the esophagus. Lungs/pleura: Small right tracheal diverticulum series 4, image 18. The airway otherwise is patent. Multiple bilateral predominantly peripheral nodular opacities in the bilateral lungs slightly more pronounced in the bilateral lower lungs. No pleural effusion or pneumothorax. Upper Abdomen: Limited images of the upper abdomen are unremarkable. Soft Tissues/Bones: The thyroid gland and remaining soft tissue structures of the neck appear unremarkable. Patient has undergone left mastectomy. Vertebral body height and alignment is maintained. Multilevel endplate spondylosis. 1.  No evidence of central or segmental pulmonary emboli in the pulmonary arterial system. 2.  There are patchy bilateral diffuse ground-glass opacities most pronounced at the periphery and lower lungs. Commonly reported imaging features of COVID-19 pneumonia are present. Other processes such as influenza pneumonia and organizing pneumonia, as can be seen with drug toxicity and connective tissue disease, can cause a similar imaging pattern. A follow-up chest CT 3-6 months after resolution of patient's symptoms suggested to reassess lung parenchyma. 3.  Status post left mastectomy. No evidence of destructive lytic or blastic abnormality. EKG:  Sinus tachycardia    ASSESSMENT:    Principal Problem:    COVID-19  Active Problems:    Malignant neoplasm of female breast ([facility] [address]    Migraine without aura    Hyperlipidemia  Resolved Problems:    * No resolved hospital problems. *    PLAN:  Admit to Covid unit  Supportive care including 02/steroids/remdesivir/lovenox  Consult ID  Follow biomarkers  Cont zinc/vit c    Time spent face to face with patient along with family counseling and discussing care exceeded 50% of the time of the visit. Additional time spent reviewing images and labs, discussing case with nursing, support staff and other physicians; as well as coordinating care.     Sindi Smith, MD  6:55 PM  11/14/2020    NOTE:  This report was transcribed using voice recognition software. Every effort was made to ensure accuracy; however, inadvertent computerized transcription errors may be present.

## 2020-11-15 NOTE — PROGRESS NOTES
Remdesivir Initiation Note    Vaishali Jackson meets criteria for initiation of remdesivir under the emergency use authorization (EUA) based on the following:   Known or suspected COVID-19   Severe disease (SpO2 ? 94% on RA, requiring supplemental O2, or requiring invasive mechanical ventilation)   Acceptable renal function  o CrCl ? 30 ml/min based on SCr obtained prior to initiation OR   o CrCl < 30 ml/min but the potential benefit of remdesivir outweighs the risk   Acceptable hepatic function (ALT within 5 times ULN)    Liver function tests will be monitored daily while on remdesivir.       Thank you for this consult,    Jakob Gomes, PharmD 11/14/2020 11:06 PM   [phone]

## 2020-11-15 NOTE — ED NOTES
Confirmed CT was done unable to see results due to technical issue.       Herbie Real RN  11/14/20 2004

## 2020-11-15 NOTE — PROGRESS NOTES
Subjective:  Vaishali was seen and examined at bedside today. The patient's questions were answered and tests were reviewed. There were no new problems reported overnight. Patient is not drinking or eating too much  She is complaining of a left sinus headache -different than her normal migraine type headache  Her breathing remains labored at rest and with any activity    A complete review of systems and social history was completed on admission and remains unchanged unless otherwise noted    Scheduled Meds:   mometasone-formoterol  2 puff Inhalation BID    enoxaparin  40 mg Subcutaneous BID    zinc gluconate  50 mg Oral Daily    vitamin C  500 mg Oral Daily    cefTRIAXone (ROCEPHIN) IV  2 g Intravenous Q24H    azithromycin  500 mg Intravenous Q24H    [START ON 11/16/2020] remdesivir IVPB  100 mg Intravenous Q24H    dexamethasone  6 mg Oral Daily     Continuous Infusions:   dextrose 5% and 0.45% NaCl with KCl 20 mEq 120 mL/hr at 11/15/20 0131     PRN Meds:traMADol, acetaminophen, sodium chloride    Objective:  BP (!) 105/54   Pulse 72   Temp 97.8 °F (36.6 °C) (Temporal)   Resp 20   Ht 5' 6\" (1.676 m)   Wt 170 lb (77.1 kg)   LMP 11/14/2013   SpO2 92%   BMI 27.44 kg/m²   In: 180 [P.O.:180]  Out: -    In: 180   Out: -      AAO x 3, currently in NAD  RRR, pos S1, S2  CTA bilaterally, no wheeze, rales or rhonchi  bowel sounds present, nontender, nondistended  No clubbing, cyanosis, or edema  No neuro changes   No obvious rashes or lesions.     Recent Labs     11/14/20  1551 11/15/20  0758   WBC 2.6* 1.3*   HGB 14.1 13.5    156     Recent Labs     11/14/20  1551 11/15/20  0758    137   K 4.5 4.1    107   CO2 24 21*   BUN 15 13   CREATININE 0.8 0.6   GLUCOSE 88 211*     Recent Labs     11/14/20  1551 11/15/20  0758   BILITOT 0.7 0.4   ALKPHOS 112* 109*   AST 93* 64*   ALT 69* 61*     Recent Labs     11/14/20  2312   INR 1.1     Recent Labs     11/14/20  1551   TROPONINI <0.01         Cta Chest W Contrast    Result Date: 11/14/2020  EXAMINATION: CTA OF THE CHEST 11/14/2020 5:45 pm TECHNIQUE: CTA of the chest was performed after the administration of intravenous contrast.  Multiplanar reformatted images are provided for review. MIP images are provided for review. Dose modulation, iterative reconstruction, and/or weight based adjustment of the mA/kV was utilized to reduce the radiation dose to as low as reasonably achievable. COMPARISON: CT chest from November 12, 2015 HISTORY: ORDERING SYSTEM PROVIDED HISTORY: hypoxia. History of breast cancer TECHNOLOGIST PROVIDED HISTORY: Reason for exam:->hypoxia. History of breast cancer FINDINGS: Pulmonary Arteries: Adequate opacification of the pulmonary arterial system to exclude central and segmental pulmonary emboli. Mediastinum: No evidence of mediastinal lymphadenopathy. The heart and pericardium demonstrate no acute abnormality. There is no acute abnormality of the thoracic aorta. Normal course and appearance of the esophagus. Lungs/pleura: Small right tracheal diverticulum series 4, image 18. The airway otherwise is patent. Multiple bilateral predominantly peripheral nodular opacities in the bilateral lungs slightly more pronounced in the bilateral lower lungs. No pleural effusion or pneumothorax. Upper Abdomen: Limited images of the upper abdomen are unremarkable. Soft Tissues/Bones: The thyroid gland and remaining soft tissue structures of the neck appear unremarkable. Patient has undergone left mastectomy. Vertebral body height and alignment is maintained. Multilevel endplate spondylosis. 1.  No evidence of central or segmental pulmonary emboli in the pulmonary arterial system. 2.  There are patchy bilateral diffuse ground-glass opacities most pronounced at the periphery and lower lungs. Commonly reported imaging features of COVID-19 pneumonia are present.   Other processes such as influenza pneumonia and organizing pneumonia, as can be seen with drug toxicity and connective tissue disease, can cause a similar imaging pattern. A follow-up chest CT 3-6 months after resolution of patient's symptoms suggested to reassess lung parenchyma. 3.  Status post left mastectomy. No evidence of destructive lytic or blastic abnormality. Assessment:  Henry Ricks is a 51y.o. year old female who presented on 11/14/2020 and is being treated for:  Principal Problem:    COVID-19  Active Problems:    Malignant neoplasm of female breast ([address]    Migraine without aura    Hyperlipidemia  Resolved Problems:    * No resolved hospital problems.  *    Plan  · Cut down on IV fluids  · Treat sinus pressure  · Otherwise continue current supportive treatments including oxygen, steroids, remdesivir, Lovenox and cough meds  · Continue to follow biomarkers as per ID  · Discussed with and updated patient's  on the phone    Da Weinstein MD  5:06 PM  11/15/2020

## 2020-11-16 LAB
ALBUMIN SERPL-MCNC: 3.4 G/DL (ref 3.5–5.2)
ALP BLD-CCNC: 93 U/L (ref 35–104)
ALT SERPL-CCNC: 81 U/L (ref 0–32)
ANION GAP SERPL CALCULATED.3IONS-SCNC: 10 MMOL/L (ref 7–16)
AST SERPL-CCNC: 65 U/L (ref 0–31)
BASOPHILS ABSOLUTE: 0 E9/L (ref 0–0.2)
BASOPHILS RELATIVE PERCENT: 0 % (ref 0–2)
BILIRUB SERPL-MCNC: 0.2 MG/DL (ref 0–1.2)
BUN BLDV-MCNC: 16 MG/DL (ref 6–20)
CALCIUM SERPL-MCNC: 8.2 MG/DL (ref 8.6–10.2)
CHLORIDE BLD-SCNC: 112 MMOL/L (ref 98–107)
CHOLESTEROL, TOTAL: 127 MG/DL (ref 0–199)
CO2: 22 MMOL/L (ref 22–29)
CREAT SERPL-MCNC: 0.7 MG/DL (ref 0.5–1)
EOSINOPHILS ABSOLUTE: 0 E9/L (ref 0.05–0.5)
EOSINOPHILS RELATIVE PERCENT: 0 % (ref 0–6)
GFR AFRICAN AMERICAN: >60
GFR NON-AFRICAN AMERICAN: >60 ML/MIN/1.73
GLUCOSE BLD-MCNC: 116 MG/DL (ref 74–99)
HCT VFR BLD CALC: 36.6 % (ref 34–48)
HDLC SERPL-MCNC: 31 MG/DL
HEMOGLOBIN: 12 G/DL (ref 11.5–15.5)
IMMATURE GRANULOCYTES #: 0.02 E9/L
IMMATURE GRANULOCYTES %: 0.4 % (ref 0–5)
LDL CHOLESTEROL CALCULATED: 76 MG/DL (ref 0–99)
LYMPHOCYTES ABSOLUTE: 0.76 E9/L (ref 1.5–4)
LYMPHOCYTES RELATIVE PERCENT: 15 % (ref 20–42)
MCH RBC QN AUTO: 29.4 PG (ref 26–35)
MCHC RBC AUTO-ENTMCNC: 32.8 % (ref 32–34.5)
MCV RBC AUTO: 89.7 FL (ref 80–99.9)
MONOCYTES ABSOLUTE: 0.39 E9/L (ref 0.1–0.95)
MONOCYTES RELATIVE PERCENT: 7.7 % (ref 2–12)
NEUTROPHILS ABSOLUTE: 3.88 E9/L (ref 1.8–7.3)
NEUTROPHILS RELATIVE PERCENT: 76.9 % (ref 43–80)
PDW BLD-RTO: 12.4 FL (ref 11.5–15)
PLATELET # BLD: 159 E9/L (ref 130–450)
PMV BLD AUTO: 11 FL (ref 7–12)
POTASSIUM SERPL-SCNC: 3.9 MMOL/L (ref 3.5–5)
RBC # BLD: 4.08 E12/L (ref 3.5–5.5)
SODIUM BLD-SCNC: 144 MMOL/L (ref 132–146)
TOTAL PROTEIN: 6.1 G/DL (ref 6.4–8.3)
TRIGL SERPL-MCNC: 101 MG/DL (ref 0–149)
VITAMIN D 25-HYDROXY: 26 NG/ML (ref 30–100)
VLDLC SERPL CALC-MCNC: 20 MG/DL
WBC # BLD: 5.1 E9/L (ref 4.5–11.5)

## 2020-11-16 PROCEDURE — 2580000003 HC RX 258: Performed by: SPECIALIST

## 2020-11-16 PROCEDURE — 6360000002 HC RX W HCPCS: Performed by: SPECIALIST

## 2020-11-16 PROCEDURE — 36415 COLL VENOUS BLD VENIPUNCTURE: CPT

## 2020-11-16 PROCEDURE — 2700000000 HC OXYGEN THERAPY PER DAY

## 2020-11-16 PROCEDURE — 6370000000 HC RX 637 (ALT 250 FOR IP): Performed by: SPECIALIST

## 2020-11-16 PROCEDURE — 80061 LIPID PANEL: CPT

## 2020-11-16 PROCEDURE — 80053 COMPREHEN METABOLIC PANEL: CPT

## 2020-11-16 PROCEDURE — 1200000000 HC SEMI PRIVATE

## 2020-11-16 PROCEDURE — 6370000000 HC RX 637 (ALT 250 FOR IP): Performed by: INTERNAL MEDICINE

## 2020-11-16 PROCEDURE — 6360000002 HC RX W HCPCS: Performed by: INTERNAL MEDICINE

## 2020-11-16 PROCEDURE — 85025 COMPLETE CBC W/AUTO DIFF WBC: CPT

## 2020-11-16 PROCEDURE — 82785 ASSAY OF IGE: CPT

## 2020-11-16 PROCEDURE — 94640 AIRWAY INHALATION TREATMENT: CPT

## 2020-11-16 PROCEDURE — 2500000003 HC RX 250 WO HCPCS: Performed by: SPECIALIST

## 2020-11-16 PROCEDURE — 82784 ASSAY IGA/IGD/IGG/IGM EACH: CPT

## 2020-11-16 RX ORDER — PSEUDOEPHEDRINE HCL 120 MG/1
240 TABLET, FILM COATED, EXTENDED RELEASE ORAL DAILY PRN
Status: DISCONTINUED | OUTPATIENT
Start: 2020-11-16 | End: 2020-11-19 | Stop reason: HOSPADM

## 2020-11-16 RX ORDER — VITAMIN B COMPLEX
2000 TABLET ORAL DAILY
Status: DISCONTINUED | OUTPATIENT
Start: 2020-11-16 | End: 2020-11-19 | Stop reason: HOSPADM

## 2020-11-16 RX ORDER — CETIRIZINE HYDROCHLORIDE 10 MG/1
10 TABLET ORAL DAILY PRN
Status: DISCONTINUED | OUTPATIENT
Start: 2020-11-16 | End: 2020-11-19 | Stop reason: HOSPADM

## 2020-11-16 RX ADMIN — MOMETASONE FUROATE AND FORMOTEROL FUMARATE DIHYDRATE 2 PUFF: 100; 5 AEROSOL RESPIRATORY (INHALATION) at 07:43

## 2020-11-16 RX ADMIN — DEXAMETHASONE 6 MG: 4 TABLET ORAL at 08:16

## 2020-11-16 RX ADMIN — Medication 2000 UNITS: at 17:35

## 2020-11-16 RX ADMIN — ENOXAPARIN SODIUM 40 MG: 40 INJECTION SUBCUTANEOUS at 20:12

## 2020-11-16 RX ADMIN — Medication 50 MG: at 08:16

## 2020-11-16 RX ADMIN — REMDESIVIR 100 MG: 100 INJECTION, POWDER, LYOPHILIZED, FOR SOLUTION INTRAVENOUS at 00:10

## 2020-11-16 RX ADMIN — ENOXAPARIN SODIUM 40 MG: 40 INJECTION SUBCUTANEOUS at 08:17

## 2020-11-16 RX ADMIN — REMDESIVIR 100 MG: 100 INJECTION, POWDER, LYOPHILIZED, FOR SOLUTION INTRAVENOUS at 23:56

## 2020-11-16 RX ADMIN — PSEUDOEPHEDRINE HCL 240 MG: 120 TABLET, FILM COATED, EXTENDED RELEASE ORAL at 08:16

## 2020-11-16 RX ADMIN — THIAMINE HCL TAB 100 MG 100 MG: 100 TAB at 08:16

## 2020-11-16 RX ADMIN — WATER 2 G: 1 INJECTION INTRAMUSCULAR; INTRAVENOUS; SUBCUTANEOUS at 23:56

## 2020-11-16 RX ADMIN — OXYCODONE HYDROCHLORIDE AND ACETAMINOPHEN 500 MG: 500 TABLET ORAL at 08:16

## 2020-11-16 RX ADMIN — CETIRIZINE HYDROCHLORIDE 10 MG: 10 TABLET, FILM COATED ORAL at 08:16

## 2020-11-16 ASSESSMENT — PAIN DESCRIPTION - DESCRIPTORS: DESCRIPTORS: HEADACHE

## 2020-11-16 ASSESSMENT — PAIN DESCRIPTION - LOCATION: LOCATION: HEAD

## 2020-11-16 ASSESSMENT — PAIN DESCRIPTION - PAIN TYPE: TYPE: ACUTE PAIN

## 2020-11-16 ASSESSMENT — PAIN SCALES - GENERAL
PAINLEVEL_OUTOF10: 0
PAINLEVEL_OUTOF10: 3
PAINLEVEL_OUTOF10: 0
PAINLEVEL_OUTOF10: 0

## 2020-11-16 NOTE — CARE COORDINATION
HEMANT Note: 11/16/2020 at 1:36pm: COVID POSITIVE - test done on 11/14. CM called and spoke to the patient in her room on the phone. States she lives with her  in a 2 story home with the master bedroom on the first level. Independent with her ADL's. No hx of home oxygen or nebulizer. Currently on 2L [place]. DME: none. Sttates no hx of HHc or SNF. PCP: Dr. Otis Rasmussen. Pharmacy: [facility] on [address]. States her plan is to return home when discharged and her  will provide transportation.  Carlos Lopez RN

## 2020-11-16 NOTE — ACP (ADVANCE CARE PLANNING)
Advance Care Planning     Advance Care Planning Activator (Inpatient)  Conversation Note      Date of ACP Conversation: 11/16/2020  Conversation Conducted with: Patient  ACP Activator: Neri Black RN    *When Decision Maker makes decisions on behalf of the incapacitated patient: Decision Maker is asked to consider and make decisions based on patient values, known preferences, or best interests. Health Care Decision Maker:     Current Designated Health Care Decision Maker:   Primary Decision Maker: Brigitte Wright - [phone]    Secondary Decision Maker: Tavia Ray - Brother/Sister - [phone]     Note: If the relationship of these Decision-Makers to the patient does NOT follow your state's Next of Kin hierarchy, recommend that patient complete ACP document that meets state-specific requirements to allow them to act on the patient's behalf when appropriate. Care Preferences    Ventilation: \"If you were in your present state of health and suddenly became very ill and were unable to breathe on your own, what would your preference be about the use of a ventilator (breathing machine) if it were available to you? \"      Would the patient desire the use of ventilator (breathing machine)?: Pt states \" Only if my doctor recommends it\"    \"If your health worsens and it becomes clear that your chance of recovery is unlikely, what would your preference be about the use of a ventilator (breathing machine) if it were available to you? \"     Would the patient desire the use of ventilator (breathing machine)?: Patient states \"Only if my doctor recommends it\"    Resuscitation  \"CPR works best to restart the heart when there is a sudden event, like a heart attack, in someone who is otherwise healthy. Unfortunately, CPR does not typically restart the heart for people who have serious health conditions or who are very sick. \"    \"In the event your heart stopped as a result of an underlying serious health condition, would you want attempts to be made to restart your heart (answer \"yes\" for attempt to resuscitate) or would you prefer a natural death (answer \"no\" for do not attempt to resuscitate)? \" yes      NOTE: If the patient has a valid advance directive AND now provides care preference(s) that are inconsistent with that prior directive, advise the patient to consider either: creating a new advance directive that complies with state-specific requirements; or, if that is not possible, orally revoking that prior directive in accordance with state-specific requirements, which must be documented in the EHR. [] Yes   [x] No   Educated Patient / Bella Doan regarding differences between Advance Directives and portable DNR orders.     Length of ACP Conversation in minutes:      Conversation Outcomes:  [x] ACP discussion completed  [] Existing advance directive reviewed with patient; no changes to patient's previously recorded wishes  [] New Advance Directive completed  [] Portable Do Not Rescitate prepared for Provider review and signature  [] POLST/POST/MOLST/MOST prepared for Provider review and signature      Follow-up plan:    [] Schedule follow-up conversation to continue planning  [] Referred individual to Provider for additional questions/concerns   [] Advised patient/agent/surrogate to review completed ACP document and update if needed with changes in condition, patient preferences or care setting    [x] This note routed to one or more involved healthcare providers

## 2020-11-16 NOTE — CONSULTS
[facility]  Consult Note    [address] [place] [address]  [address] [facility], [address] [place] [address] Street  Phone [phone]   [phone]      Admit Date: 2020  3:12 PM  Pt Name: Pj Patiño  MRN: [id number]  : 1970  Reason for Consult:    Chief Complaint   Patient presents with    Influenza     coughing since thursday with shortness of breath and pulse ox 86%, no n/v, diarrhea, bodyaches, headache     Requesting Physician:  Matilda Lester MD  PCP: Aron Garcia MD  History Obtained From:  patient  ID consulted for COVID-19 [U07.1]  on hospital day        Chief Complaint   Patient presents with    Influenza     coughing since thursday with shortness of breath and pulse ox 86%, no n/v, diarrhea, bodyaches, headache     HISTORYOF PRESENT ILLNESS      Vaishali Johns is a 51 y.o. female who presents with significant past medical history of  has a past medical history of Cancer ([facility] [address] and Headache.   who presents with   Chief Complaint   Patient presents with    Influenza     coughing since thursday with shortness of breath and pulse ox 86%, no n/v, diarrhea, bodyaches, headache     ED TRIAGEVITALS  BP: (!) 105/54, Temp: 97.8 °F (36.6 °C), Pulse: 72, Resp: 20, SpO2: 92 %  HPI  Pt from home with not feeling well for the past week  She came in mostly for chest presure/cough and worsening sob  She has had f/ha/myalgias in the 3 days  She boticed her o2 sat was decreasing t0 86%  Wbc2.8 cr0.8   Pt has h/o breast ca on rx  She works in an office   She works mask  minimal pt contact  She has 2 high school children who are not sick and her  has not been sick   She is in bed currently has a bad ha  REVIEW OF SYSTEMS    (2-9 systems for level 4, 10 or more for level 5)     REVIEW OFSYSTEMS:    CONSTITUTIONAL:   fever, chills, weight loss  ALLERGIES:    No urticaria, hay fever,    EYES:     No blurry vision, loss of vision,eye pain  ENT:      No hearing loss, sore throat  CARDIOVASCULAR:  No chest pain or palpitations  RESPIRATORY:    cough, sob  ENDOCRINE:    No increase thirst, urination   HEME-LYMPH:   No easy bruising or bleeding  GI:     nausea, vomiting or diarrhea  :     No urinary complaints  NEURO:    No seizures, stroke, +HA  MUSCULOSKELETAL:    muscle aches or pain, no jointpain  SKIN:     No rash or itch  PSYCH:    No depression or anxiety    Medications Prior to Admission: LIPITOR 10 MG tablet, Take 1 tablet by mouth daily  SUMAtriptan (IMITREX) 100 MG tablet, Take 1 tablet by mouth every 12 hours as needed for Migraine'  CURRENT MEDICATIONS     Current Facility-Administered Medications:     mometasone-formoterol (DULERA) 100-5 MCG/ACT inhaler 2 puff, 2 puff, Inhalation, BID, Carlos Crowe MD, 2 puff at 11/15/20 1911    dextrose 5 % and 0.45 % NaCl with KCl 20 mEq infusion, , Intravenous, Once, Carlos Crowe MD    traMADol Cecilia Corn) tablet 100 mg, 100 mg, Oral, Q6H PRN, Carlos Crowe MD  [facility]  [START ON 11/16/2020] cetirizine (ZYRTEC) tablet 10 mg, 10 mg, Oral, Daily **AND** [START ON 11/16/2020] pseudoephedrine (SUDAFED 12 HR) extended release tablet 240 mg, 240 mg, Oral, Daily, Carlos Crowe MD    enoxaparin (LOVENOX) injection 40 mg, 40 mg, Subcutaneous, BID, Carlos Crowe MD, 40 mg at 11/15/20 8331    zinc gluconate tablet 50 mg, 50 mg, Oral, Daily, Carlos Crowe MD, 50 mg at 11/15/20 9101    vitamin C (ASCORBIC ACID) tablet 500 mg, 500 mg, Oral, Daily, Carlos Crowe MD, 500 mg at 11/15/20 4185    acetaminophen (TYLENOL) tablet 650 mg, 650 mg, Oral, Q4H PRN, Carlos Crowe MD, 650 mg at 11/15/20 0830    cefTRIAXone (ROCEPHIN) 2 g in sterile water 20 mL IV syringe, 2 g, Intravenous, Q24H, Howard Whitman MD, 2 g at 11/14/20 7072    azithromycin (ZITHROMAX) 500 mg in D5W 250ml Vial Mate, 500 mg, Intravenous, Q24H, Howard Whitman MD, Stopped at 11/15/20 0230    [COMPLETED] remdesivir 200 mg in sodium chloride 0.9 % 250 mL IVPB, 200 mg, Intravenous, Once, Stopped at 11/15/20 0023 **FOLLOWED BY** [START ON 11/16/2020] remdesivir 100 mg in sodium chloride 0.9 % 250 mL IVPB, 100 mg, Intravenous, Q24H, Georgina Clark MD    0.9 % sodium chloride bolus, 30 mL, Intravenous, PRN, Georgina Clark MD    dexamethasone (DECADRON) tablet 6 mg, 6 mg, Oral, Daily, Sammi Delgadillo MD, 6 mg at 11/15/20 3093  ALLERGIES     No known allergies    There is no immunization history on file for this patient.   PAST MEDICAL HISTORY     Past Medical History:   Diagnosis Date    Cancer [facility])     left breast    Headache      SURGICAL HISTORY       Past Surgical History:   Procedure Laterality Date    APPENDECTOMY      BREAST LUMPECTOMY      mastectomy-- left    ENDOMETRIAL ABLATION  06/28/2013    HYSTEROSCOPY  june 2013    d and c  and endometrial ablation    MASTECTOMY, RADICAL  2006    left     FAMILY HISTORY       Family History   Problem Relation Age of Onset    Breast Cancer Mother     Diabetes Father     Cancer Father         Prostate cancer    Glaucoma Father      SOCIAL HISTORY       Social History     Socioeconomic History    Marital status:      Spouse name: Not on file    Number of children: Not on file    Years of education: Not on file    Highest education level: Not on file   Occupational History    Not on file   Social Needs    Financial resource strain: Not on file    Food insecurity     Worry: Not on file     Inability: Not on file    Transportation needs     Medical: Not on file     Non-medical: Not on file   Tobacco Use    Smoking status: Never Smoker    Smokeless tobacco: Never Used   Substance and Sexual Activity    Alcohol use: Yes     Comment: few glassed of wine a week    Drug use: No    Sexual activity: Not on file   Lifestyle    Physical activity     Days per week: Not on file     Minutes per session: Not on file    Stress: Not on file   Relationships    Social connections     Talks on phone: Not on file     Gets together: Not on file     Attends [employer] service: Not on file     Active member of club or organization: Not on file     Attends meetings of clubs or organizations: Not on file     Relationship status: Not on file    Intimate partner violence     Fear of current or ex partner: Not on file     Emotionally abused: Not on file     Physically abused: Not on file     Forced sexual activity: Not on file   Other Topics Concern    Not on file   Social History Narrative    Not on file     ·      PHYSICAL EXAM    (up to 7 for level 4, 8 or more forlevel 5)     ED Triage Vitals   BP Temp Temp Source Pulse Resp SpO2 Height Weight   11/14/20 1457 11/14/20 1444 11/14/20 1457 11/14/20 1457 11/14/20 1457 11/14/20 1457 11/14/20 1457 11/14/20 1457   121/68 98.9 °F (37.2 °C) Oral 92 24 91 % 5' 6\" (1.676 m) 170 lb (77.1 kg)     Vitals:    Vitals:    11/14/20 2053 11/14/20 2130 11/15/20 0822 11/15/20 1625   BP: 101/71 105/70 116/82 (!) 105/54   Pulse: 67 67 70 72   Resp: 19 18 20 20   Temp:  98.1 °F (36.7 °C) 97.8 °F (36.6 °C) 97.8 °F (36.6 °C)   TempSrc:  Infrared Oral Temporal   SpO2: 96% 94% 90% 92%   Weight:       Height:         Physical Exam   Constitutional/General: Alert and oriented, NAD  Head: NC/AT  Eyes: PERRL, EOMI  Mouth: Normal mucosa, no thrush   Neck: Supple, full ROM,    Pulmonary: Lungs dec few cracklesr to auscultation bilaterally. Not in respiratory distress  Cardiovascular:  Regular rate and rhythm, no murmurs, gallops, or rubs. Abdomen: Soft, + BS. No distension. Nontender. Extremities: Moves all extremities x 4.    Warm and well perfused  Pulses:  Distal pulses intact  Skin: Warm and dry without rash  Neurologic:    No focal deficits tired  Psych: Normal Affect     DIAGNOSTICRESULTS   RADIOLOGY:   Cta Chest W Contrast    Result Date: 11/14/2020  EXAMINATION: CTA OF THE CHEST 11/14/2020 5:45 pm TECHNIQUE: CTA of the chest was performed after the administration of intravenous contrast.  Multiplanar reformatted images are provided for review. MIP images are provided for review. Dose modulation, iterative reconstruction, and/or weight based adjustment of the mA/kV was utilized to reduce the radiation dose to as low as reasonably achievable. COMPARISON: CT chest from November 12, 2015 HISTORY: ORDERING SYSTEM PROVIDED HISTORY: hypoxia. History of breast cancer TECHNOLOGIST PROVIDED HISTORY: Reason for exam:->hypoxia. History of breast cancer FINDINGS: Pulmonary Arteries: Adequate opacification of the pulmonary arterial system to exclude central and segmental pulmonary emboli. Mediastinum: No evidence of mediastinal lymphadenopathy. The heart and pericardium demonstrate no acute abnormality. There is no acute abnormality of the thoracic aorta. Normal course and appearance of the esophagus. Lungs/pleura: Small right tracheal diverticulum series 4, image 18. The airway otherwise is patent. Multiple bilateral predominantly peripheral nodular opacities in the bilateral lungs slightly more pronounced in the bilateral lower lungs. No pleural effusion or pneumothorax. Upper Abdomen: Limited images of the upper abdomen are unremarkable. Soft Tissues/Bones: The thyroid gland and remaining soft tissue structures of the neck appear unremarkable. Patient has undergone left mastectomy. Vertebral body height and alignment is maintained. Multilevel endplate spondylosis. 1.  No evidence of central or segmental pulmonary emboli in the pulmonary arterial system. 2.  There are patchy bilateral diffuse ground-glass opacities most pronounced at the periphery and lower lungs. Commonly reported imaging features of COVID-19 pneumonia are present. Other processes such as influenza pneumonia and organizing pneumonia, as can be seen with drug toxicity and connective tissue disease, can cause a similar imaging pattern. A follow-up chest CT 3-6 months after resolution of patient's symptoms suggested to reassess lung parenchyma. 3.  Status post left mastectomy. No evidence of destructive lytic or blastic abnormality.      LABS  Recent Labs     11/14/20  1551 11/15/20  0758   WBC 2.6* 1.3*   HGB 14.1 13.5   HCT 42.2 39.2   MCV 88.3 86.0    156     Recent Labs     11/14/20  1551 11/15/20  0758    137   K 4.5 4.1    107   CO2 24 21*   BUN 15 13   CREATININE 0.8 0.6   GFRAA >60 >60   LABGLOM >60 >60   GLUCOSE 88 211*   PROT 7.6 6.3*   LABALBU 4.1 3.4*   CALCIUM 8.8 8.0*   BILITOT 0.7 0.4   ALKPHOS 112* 109*   AST 93* 64*   ALT 69* 61*     Recent Labs     11/14/20  2312   PROCAL 0.06     Lab Results   Component Value Date    CRP 2.0 (H) 11/14/2020     No results found for: SEDRATE  No results found for: [id number]  Lab Results   Component Value Date    COVID19 DETECTED 11/14/2020     COVID-19/ROSAURA-COV2 LABS  Recent Labs     11/14/20  1551 11/14/20  2312 11/15/20  0125 11/15/20  0758   CRP  --  2.0*  --   --    PROCAL  --  0.06  --   --    FERRITIN  --  796  --   --    LDH  --  356*  --   --    TROPONINI <0.01  --   --   --    DDIMER  --   --  <200  --    FIBRINOGEN  --  627*  --   --    INR  --  1.1  --   --    PROTIME  --  12.1  --   --    AST 93*  --   --  64*   ALT 69*  --   --  61*     Lab Results   Component Value Date    CHOL 209 07/06/2018    TRIG 155 07/06/2018    HDL 48 07/06/2018    LDLCALC 130 07/06/2018    LABVLDL 31 07/06/2018          MICROBIOLOGY:    Patient is a 51 y.o. female who presented with   Chief Complaint   Patient presents with    Influenza     coughing since thursday with shortness of breath and pulse ox 86%, no n/v, diarrhea, bodyaches, headache        FINAL IMPRESSION      1. COVID-19         SARS-COV-2 pneumonia  Leukopenia   transaminitis  H/o left breast ca    · Remdesivir x5 days  · Decadron 6mg qday x10 days  · Vitamins   · Check biomarkers  · Discussed proning  · Baseline triglyceride/LIPID PANEL  · DVT prophylaxis/TREATMENT        enoxaparin (LOVENOX) injection 40 mg, BID  zinc gluconate tablet 50 mg, Daily  vitamin C (ASCORBIC ACID) tablet 500 mg, Daily  acetaminophen (TYLENOL) tablet 650 mg, Q4H PRN  cefTRIAXone (ROCEPHIN) 2 g in sterile water 20 mL IV syringe, Q24H  azithromycin (ZITHROMAX) 500 mg in D5W 250ml Vial Mate, Q24H  [START ON 11/16/2020] remdesivir 100 mg in sodium chloride 0.9 % 250 mL IVPB, Q24H  0.9 % sodium chloride bolus, PRN  dexamethasone (DECADRON) tablet 6 mg, Daily          Imaging and labs were reviewed per medical records and any ID pertinent labs were addressed with the patient. The patient/FAMILY  was educated about the diagnosis, prognosis, indications, risks and benefits of treatment. An opportunity to ask questions was given to the patient/FAMILY and questions were answered. Thank you for involving me in the care of Vaishali Jackson. Please do not hesitate to call for any questions or concerns.          Electronically signed by Brigitte Garcia MD on 11/15/2020 at 8:07 PM

## 2020-11-16 NOTE — PLAN OF CARE
Problem: Airway Clearance - Ineffective  Goal: Achieve or maintain patent airway  Outcome: Met This Shift     Problem: Gas Exchange - Impaired  Goal: Absence of hypoxia  Outcome: Met This Shift  Goal: Promote optimal lung function  Outcome: Met This Shift     Problem: Breathing Pattern - Ineffective  Goal: Ability to achieve and maintain a regular respiratory rate  Outcome: Met This Shift     Problem:  Body Temperature -  Risk of, Imbalanced  Goal: Ability to maintain a body temperature within defined limits  Outcome: Met This Shift  Goal: Will regain or maintain usual level of consciousness  Outcome: Met This Shift  Goal: Complications related to the disease process, condition or treatment will be avoided or minimized  Outcome: Met This Shift     Problem: Isolation Precautions - Risk of Spread of Infection  Goal: Prevent transmission of infection  Outcome: Met This Shift     Problem: Nutrition Deficits  Goal: Optimize nutrtional status  Outcome: Met This Shift     Problem: Risk for Fluid Volume Deficit  Goal: Maintain normal heart rhythm  Outcome: Met This Shift  Goal: Maintain absence of muscle cramping  Outcome: Met This Shift  Goal: Maintain normal serum potassium, sodium, calcium, phosphorus, and pH  Outcome: Met This Shift     Problem: Loneliness or Risk for Loneliness  Goal: Demonstrate positive use of time alone when socialization is not possible  Outcome: Met This Shift     Problem: Fatigue  Goal: Verbalize increase energy and improved vitality  Outcome: Met This Shift     Problem: Patient Education: Go to Patient Education Activity  Goal: Patient/Family Education  Outcome: Met This Shift     Problem: Pain:  Goal: Pain level will decrease  Description: Pain level will decrease  Outcome: Met This Shift  Goal: Control of acute pain  Description: Control of acute pain  Outcome: Met This Shift  Goal: Control of chronic pain  Description: Control of chronic pain  Outcome: Met This Shift

## 2020-11-16 NOTE — PROGRESS NOTES
11/16/20  0700   CRP  --  2.0*  --   --   --    PROCAL  --  0.06  --   --   --    FERRITIN  --  796  --   --   --    LDH  --  356*  --   --   --    TROPONINI <0.01  --   --   --   --    DDIMER  --   --  <200  --   --    FIBRINOGEN  --  627*  --   --   --    INR  --  1.1  --   --   --    PROTIME  --  12.1  --   --   --    AST 93*  --   --  64* 65*   ALT 69*  --   --  61* 81*   TRIG  --   --   --   --  101     Lab Results   Component Value Date    CHOL 127 11/16/2020    TRIG 101 11/16/2020    HDL 31 11/16/2020    LDLCALC 76 11/16/2020    LABVLDL 20 11/16/2020     Lab Results   Component Value Date/Time    VITD25 26 (L) 11/15/2020 07:58 AM     Recent Labs     11/14/20  1551 11/15/20  0758 11/16/20  0700   WBC 2.6* 1.3* 5.1   HGB 14.1 13.5 12.0   HCT 42.2 39.2 36.6    156 159   MCV 88.3 86.0 89.7   MCH 29.5 29.6 29.4   MCHC 33.4 34.4 32.8   RDW 12.7 12.5 12.4   LYMPHOPCT 23.5 20.0 15.0*   MONOPCT 6.1 6.1 7.7   MYELOPCT 0.9  --   --    BASOPCT 0.0 0.0 0.0   MONOSABS 0.16 0.08* 0.39   LYMPHSABS 0.62* 0.26* 0.76*   EOSABS 0.00* 0.00* 0.00*   BASOSABS 0.00 0.00 0.00     Recent Labs     11/14/20  1551  11/15/20  0758 11/16/20  0700     --  137 144   K 4.5   < > 4.1 3.9     --  107 112*   CO2 24  --  21* 22   BUN 15  --  13 16   CREATININE 0.8  --  0.6 0.7   GFRAA >60  --  >60 >60   LABGLOM >60  --  >60 >60   GLUCOSE 88  --  211* 116*   PROT 7.6  --  6.3* 6.1*   LABALBU 4.1  --  3.4* 3.4*   CALCIUM 8.8  --  8.0* 8.2*   BILITOT 0.7  --  0.4 0.2   ALKPHOS 112*  --  109* 93   AST 93*  --  64* 65*   ALT 69*  --  61* 81*    < > = values in this interval not displayed.      U/A:    Lab Results   Component Value Date    COLORU Yellow 06/30/2016    PROTEINU Negative 06/30/2016    PHUR 5.5 06/30/2016    CLARITYU Clear 06/30/2016    SPECGRAV >=1.030 06/30/2016    LEUKOCYTESUR Negative 06/30/2016    UROBILINOGEN 0.2 06/30/2016    BILIRUBINUR Negative 06/30/2016    BLOODU Negative 06/30/2016    GLUCOSEU Negative

## 2020-11-17 LAB
ALBUMIN SERPL-MCNC: 3.4 G/DL (ref 3.5–5.2)
ALP BLD-CCNC: 88 U/L (ref 35–104)
ALT SERPL-CCNC: 63 U/L (ref 0–32)
ANION GAP SERPL CALCULATED.3IONS-SCNC: 8 MMOL/L (ref 7–16)
AST SERPL-CCNC: 40 U/L (ref 0–31)
BILIRUB SERPL-MCNC: 0.3 MG/DL (ref 0–1.2)
BUN BLDV-MCNC: 20 MG/DL (ref 6–20)
CALCIUM SERPL-MCNC: 8.3 MG/DL (ref 8.6–10.2)
CHLORIDE BLD-SCNC: 108 MMOL/L (ref 98–107)
CO2: 24 MMOL/L (ref 22–29)
CREAT SERPL-MCNC: 0.7 MG/DL (ref 0.5–1)
GFR AFRICAN AMERICAN: >60
GFR NON-AFRICAN AMERICAN: >60 ML/MIN/1.73
GLUCOSE BLD-MCNC: 90 MG/DL (ref 74–99)
IGA: 85 MG/DL (ref 70–400)
IGG: 803 MG/DL (ref 700–1600)
IGM: 71 MG/DL (ref 40–230)
L. PNEUMOPHILA SEROGP 1 UR AG: NORMAL
POTASSIUM SERPL-SCNC: 3.4 MMOL/L (ref 3.5–5)
SODIUM BLD-SCNC: 140 MMOL/L (ref 132–146)
STREP PNEUMONIAE ANTIGEN, URINE: NORMAL
TOTAL PROTEIN: 5.9 G/DL (ref 6.4–8.3)

## 2020-11-17 PROCEDURE — 6360000002 HC RX W HCPCS: Performed by: INTERNAL MEDICINE

## 2020-11-17 PROCEDURE — 80053 COMPREHEN METABOLIC PANEL: CPT

## 2020-11-17 PROCEDURE — 2060000000 HC ICU INTERMEDIATE R&B

## 2020-11-17 PROCEDURE — 6370000000 HC RX 637 (ALT 250 FOR IP): Performed by: SPECIALIST

## 2020-11-17 PROCEDURE — 6370000000 HC RX 637 (ALT 250 FOR IP): Performed by: INTERNAL MEDICINE

## 2020-11-17 PROCEDURE — 94640 AIRWAY INHALATION TREATMENT: CPT

## 2020-11-17 PROCEDURE — 87450 HC DIRECT STREP B ANTIGEN: CPT

## 2020-11-17 PROCEDURE — 6360000002 HC RX W HCPCS: Performed by: SPECIALIST

## 2020-11-17 PROCEDURE — 36415 COLL VENOUS BLD VENIPUNCTURE: CPT

## 2020-11-17 PROCEDURE — 2580000003 HC RX 258: Performed by: SPECIALIST

## 2020-11-17 PROCEDURE — 2700000000 HC OXYGEN THERAPY PER DAY

## 2020-11-17 PROCEDURE — 87449 NOS EACH ORGANISM AG IA: CPT

## 2020-11-17 PROCEDURE — 2500000003 HC RX 250 WO HCPCS: Performed by: SPECIALIST

## 2020-11-17 RX ORDER — DEXAMETHASONE 4 MG/1
6 TABLET ORAL DAILY
Status: DISCONTINUED | OUTPATIENT
Start: 2020-11-17 | End: 2020-11-19 | Stop reason: HOSPADM

## 2020-11-17 RX ADMIN — THIAMINE HCL TAB 100 MG 100 MG: 100 TAB at 08:56

## 2020-11-17 RX ADMIN — DEXAMETHASONE 6 MG: 4 TABLET ORAL at 10:46

## 2020-11-17 RX ADMIN — MOMETASONE FUROATE AND FORMOTEROL FUMARATE DIHYDRATE 2 PUFF: 100; 5 AEROSOL RESPIRATORY (INHALATION) at 17:35

## 2020-11-17 RX ADMIN — MOMETASONE FUROATE AND FORMOTEROL FUMARATE DIHYDRATE 2 PUFF: 100; 5 AEROSOL RESPIRATORY (INHALATION) at 07:00

## 2020-11-17 RX ADMIN — SODIUM CHLORIDE 30 ML: 9 INJECTION, SOLUTION INTRAVENOUS at 00:40

## 2020-11-17 RX ADMIN — AZITHROMYCIN DIHYDRATE 500 MG: 500 INJECTION, POWDER, LYOPHILIZED, FOR SOLUTION INTRAVENOUS at 01:01

## 2020-11-17 RX ADMIN — REMDESIVIR 100 MG: 100 INJECTION, POWDER, LYOPHILIZED, FOR SOLUTION INTRAVENOUS at 16:21

## 2020-11-17 RX ADMIN — AZITHROMYCIN DIHYDRATE 500 MG: 500 INJECTION, POWDER, LYOPHILIZED, FOR SOLUTION INTRAVENOUS at 22:53

## 2020-11-17 RX ADMIN — POTASSIUM BICARBONATE 40 MEQ: 782 TABLET, EFFERVESCENT ORAL at 22:02

## 2020-11-17 RX ADMIN — OXYCODONE HYDROCHLORIDE AND ACETAMINOPHEN 500 MG: 500 TABLET ORAL at 08:55

## 2020-11-17 RX ADMIN — Medication 2000 UNITS: at 08:56

## 2020-11-17 RX ADMIN — Medication 50 MG: at 08:56

## 2020-11-17 RX ADMIN — ENOXAPARIN SODIUM 40 MG: 40 INJECTION SUBCUTANEOUS at 08:56

## 2020-11-17 RX ADMIN — WATER 2 G: 1 INJECTION INTRAMUSCULAR; INTRAVENOUS; SUBCUTANEOUS at 22:02

## 2020-11-17 RX ADMIN — ENOXAPARIN SODIUM 40 MG: 40 INJECTION SUBCUTANEOUS at 22:02

## 2020-11-17 ASSESSMENT — PAIN SCALES - GENERAL
PAINLEVEL_OUTOF10: 0
PAINLEVEL_OUTOF10: 0

## 2020-11-17 NOTE — PROGRESS NOTES
Pt educated on proning & ICS use . Pt will prone & use ICS as much as possible. Will continue to encourage throughout the night.

## 2020-11-17 NOTE — PROGRESS NOTES
Rachaelliene Root in the last 72 hours. Cta Chest W Contrast    Result Date: 11/14/2020  EXAMINATION: CTA OF THE CHEST 11/14/2020 5:45 pm TECHNIQUE: CTA of the chest was performed after the administration of intravenous contrast.  Multiplanar reformatted images are provided for review. MIP images are provided for review. Dose modulation, iterative reconstruction, and/or weight based adjustment of the mA/kV was utilized to reduce the radiation dose to as low as reasonably achievable. COMPARISON: CT chest from November 12, 2015 HISTORY: ORDERING SYSTEM PROVIDED HISTORY: hypoxia. History of breast cancer TECHNOLOGIST PROVIDED HISTORY: Reason for exam:->hypoxia. History of breast cancer FINDINGS: Pulmonary Arteries: Adequate opacification of the pulmonary arterial system to exclude central and segmental pulmonary emboli. Mediastinum: No evidence of mediastinal lymphadenopathy. The heart and pericardium demonstrate no acute abnormality. There is no acute abnormality of the thoracic aorta. Normal course and appearance of the esophagus. Lungs/pleura: Small right tracheal diverticulum series 4, image 18. The airway otherwise is patent. Multiple bilateral predominantly peripheral nodular opacities in the bilateral lungs slightly more pronounced in the bilateral lower lungs. No pleural effusion or pneumothorax. Upper Abdomen: Limited images of the upper abdomen are unremarkable. Soft Tissues/Bones: The thyroid gland and remaining soft tissue structures of the neck appear unremarkable. Patient has undergone left mastectomy. Vertebral body height and alignment is maintained. Multilevel endplate spondylosis. 1.  No evidence of central or segmental pulmonary emboli in the pulmonary arterial system. 2.  There are patchy bilateral diffuse ground-glass opacities most pronounced at the periphery and lower lungs. Commonly reported imaging features of COVID-19 pneumonia are present.   Other processes such as

## 2020-11-17 NOTE — PROGRESS NOTES
[facility] PROGRESS NOTE    F/u SARS-COV-2 infection FACE TO FACE    SUBJECTIVE:  Vaishali Jackson, 51 y.o., female     Pt was discussed with care team  ASLEEP BUT AROUSABLE  NO C/O   BREATHING AND FEELING BETTER  ROS:GENERAL-             GENERAL:Temperature:  Current - Temp: 97.5 °F (36.4 °C);  Max - Temp  Av.1 °F (36.7 °C)  Min: 97.5 °F (36.4 °C)  Max: 98.6 °F (37 °C)  Respiratory Rate : Resp  Avg: 15.5  Min: 14  Max: 17  Pulse Range: Pulse  Av  Min: 60  Max: 79  Blood Pressure Range:  Systolic (68KNE), YQV:307 , Min:102 , PHB:597   ; Diastolic (08GGH), OLO:71, Min:54, Max:71    Pulse ox Range: SpO2  Av.1 %  Min: 91 %  Max: 98 %  24hr I & O:      Intake/Output Summary (Last 24 hours) at 2020 1114  Last data filed at 2020 1021  Gross per 24 hour   Intake 240 ml   Output --   Net 240 ml       CONSTITUTIONAL:   Awake, alert  HEENT:    AT/NC  LUNGS:   Few crackles post b   CARDIOVASCULAR:   S1 and S2   ABDOMEN:     Normal bowel sounds, non-distended, non-tender   EXTREMITIES:   FROM    SKIN:     NO RASH   CNS:    NAD NO DEFICITS  PSYCH:   Pleasant     MEDS:  dexamethasone (DECADRON) tablet 6 mg, Daily  Vitamin D (CHOLECALCIFEROL) tablet 2,000 Units, Daily  cetirizine (ZYRTEC) tablet 10 mg, Daily PRN    And  pseudoephedrine (SUDAFED 12 HR) extended release tablet 240 mg, Daily PRN  mometasone-formoterol (DULERA) 100-5 MCG/ACT inhaler 2 puff, BID  traMADol (ULTRAM) tablet 100 mg, Q6H PRN  vitamin B-1 (THIAMINE) tablet 100 mg, Daily  enoxaparin (LOVENOX) injection 40 mg, BID  zinc gluconate tablet 50 mg, Daily  vitamin C (ASCORBIC ACID) tablet 500 mg, Daily  acetaminophen (TYLENOL) tablet 650 mg, Q4H PRN  cefTRIAXone (ROCEPHIN) 2 g in sterile water 20 mL IV syringe, Q24H  azithromycin (ZITHROMAX) 500 mg in D5W 250ml Vial Mate, Q24H  remdesivir 100 mg in sodium chloride 0.9 % 250 mL IVPB, Q24H  0.9 % sodium chloride bolus, PRN          Data:  Lab Results   Component Value Date    COVID19 DETECTED 11/14/2020     COVID-19/SARS-COV-2 LABS  Recent Labs     11/14/20  1551 11/14/20  2312 11/15/20  0125 11/15/20  0758 11/16/20  0700 11/17/20  0634   CRP  --  2.0*  --   --   --   --    PROCAL  --  0.06  --   --   --   --    FERRITIN  --  796  --   --   --   --    LDH  --  356*  --   --   --   --    TROPONINI <0.01  --   --   --   --   --    DDIMER  --   --  <200  --   --   --    FIBRINOGEN  --  627*  --   --   --   --    INR  --  1.1  --   --   --   --    PROTIME  --  12.1  --   --   --   --    AST 93*  --   --  64* 65* 40*   ALT 69*  --   --  61* 81* 63*   TRIG  --   --   --   --  101  --      Lab Results   Component Value Date    CHOL 127 11/16/2020    TRIG 101 11/16/2020    HDL 31 11/16/2020    LDLCALC 76 11/16/2020    LABVLDL 20 11/16/2020     Lab Results   Component Value Date/Time    VITD25 26 (L) 11/15/2020 07:58 AM     Recent Labs     11/14/20  1551 11/15/20  0758 11/16/20  0700   WBC 2.6* 1.3* 5.1   HGB 14.1 13.5 12.0   HCT 42.2 39.2 36.6    156 159   MCV 88.3 86.0 89.7   MCH 29.5 29.6 29.4   MCHC 33.4 34.4 32.8   RDW 12.7 12.5 12.4   LYMPHOPCT 23.5 20.0 15.0*   MONOPCT 6.1 6.1 7.7   MYELOPCT 0.9  --   --    BASOPCT 0.0 0.0 0.0   MONOSABS 0.16 0.08* 0.39   LYMPHSABS 0.62* 0.26* 0.76*   EOSABS 0.00* 0.00* 0.00*   BASOSABS 0.00 0.00 0.00     Recent Labs     11/15/20  0758 11/16/20  0700 11/17/20  0634    144 140   K 4.1 3.9 3.4*    112* 108*   CO2 21* 22 24   BUN 13 16 20   CREATININE 0.6 0.7 0.7   GFRAA >60 >60 >60   LABGLOM >60 >60 >60   GLUCOSE 211* 116* 90   PROT 6.3* 6.1* 5.9*   LABALBU 3.4* 3.4* 3.4*   CALCIUM 8.0* 8.2* 8.3*   BILITOT 0.4 0.2 0.3   ALKPHOS 109* 93 88   AST 64* 65* 40*   ALT 61* 81* 63*     U/A:    Lab Results   Component Value Date    COLORU Yellow 06/30/2016    PROTEINU Negative 06/30/2016    PHUR 5.5 06/30/2016    CLARITYU Clear 06/30/2016    SPECGRAV >=1.030 06/30/2016    LEUKOCYTESUR Negative 06/30/2016    UROBILINOGEN 0.2 06/30/2016    BILIRUBINUR Negative 06/30/2016    BLOODU Negative 06/30/2016    GLUCOSEU Negative 06/30/2016        MICRO  Blood cultures   Blood Culture, Routine   Date Value Ref Range Status   11/14/2020 24 Hours no growth  Preliminary       ASSESSMENT:    Active Hospital Problems    Diagnosis Date Noted    COVID-19 [U07.1] 11/14/2020    Hyperlipidemia [E78.5]     Migraine without aura [G43.009] 01/20/2015    Malignant neoplasm of female breast ([facility] [address] [C50.919] 12/18/2006       SARS-COV-2- positive with pneumonia  ON RA TODAY   LEUKOPENIA RESOLVED  · Remdesivir  DAY 4   · DECADRON 6MG QDAY  x10 days   · Vitamins : vitamin C, thiamine, zinc, viatmin D  · Discussed proning, side to side positions  · Is   · Inc activity      POSS D/C AFTER REMDESIVIR DOSE 11/18 IF REMAIN SOFF O2       Follow COVID-19/SARS-COV-2- [facility]        DVT prophylaxis/TREATMENT    PLAN: CONTINUE CURRENT MEDICATIONS AND SUPPORTIVE CARE.       Electronically signed by Bernadette Smith MD on 11/16/20 at 3:26 PM EST

## 2020-11-18 LAB
ALBUMIN SERPL-MCNC: 3.4 G/DL (ref 3.5–5.2)
ALP BLD-CCNC: 87 U/L (ref 35–104)
ALT SERPL-CCNC: 200 U/L (ref 0–32)
ANION GAP SERPL CALCULATED.3IONS-SCNC: 10 MMOL/L (ref 7–16)
AST SERPL-CCNC: 206 U/L (ref 0–31)
BASOPHILS ABSOLUTE: 0.01 E9/L (ref 0–0.2)
BASOPHILS RELATIVE PERCENT: 0.3 % (ref 0–2)
BILIRUB SERPL-MCNC: 0.3 MG/DL (ref 0–1.2)
BUN BLDV-MCNC: 19 MG/DL (ref 6–20)
CALCIUM SERPL-MCNC: 8.3 MG/DL (ref 8.6–10.2)
CHLORIDE BLD-SCNC: 111 MMOL/L (ref 98–107)
CO2: 23 MMOL/L (ref 22–29)
CREAT SERPL-MCNC: 0.7 MG/DL (ref 0.5–1)
EOSINOPHILS ABSOLUTE: 0 E9/L (ref 0.05–0.5)
EOSINOPHILS RELATIVE PERCENT: 0 % (ref 0–6)
GFR AFRICAN AMERICAN: >60
GFR NON-AFRICAN AMERICAN: >60 ML/MIN/1.73
GLUCOSE BLD-MCNC: 95 MG/DL (ref 74–99)
HCT VFR BLD CALC: 36.6 % (ref 34–48)
HEMOGLOBIN: 12.4 G/DL (ref 11.5–15.5)
IMMATURE GRANULOCYTES #: 0.07 E9/L
IMMATURE GRANULOCYTES %: 1.8 % (ref 0–5)
LYMPHOCYTES ABSOLUTE: 1.04 E9/L (ref 1.5–4)
LYMPHOCYTES RELATIVE PERCENT: 26 % (ref 20–42)
MCH RBC QN AUTO: 29.3 PG (ref 26–35)
MCHC RBC AUTO-ENTMCNC: 33.9 % (ref 32–34.5)
MCV RBC AUTO: 86.5 FL (ref 80–99.9)
MONOCYTES ABSOLUTE: 0.52 E9/L (ref 0.1–0.95)
MONOCYTES RELATIVE PERCENT: 13 % (ref 2–12)
NEUTROPHILS ABSOLUTE: 2.36 E9/L (ref 1.8–7.3)
NEUTROPHILS RELATIVE PERCENT: 58.9 % (ref 43–80)
PDW BLD-RTO: 12.3 FL (ref 11.5–15)
PLATELET # BLD: 206 E9/L (ref 130–450)
PMV BLD AUTO: 11.1 FL (ref 7–12)
POTASSIUM SERPL-SCNC: 3.7 MMOL/L (ref 3.5–5)
RBC # BLD: 4.23 E12/L (ref 3.5–5.5)
SODIUM BLD-SCNC: 144 MMOL/L (ref 132–146)
TOTAL PROTEIN: 6.2 G/DL (ref 6.4–8.3)
WBC # BLD: 4 E9/L (ref 4.5–11.5)

## 2020-11-18 PROCEDURE — 6360000002 HC RX W HCPCS: Performed by: INTERNAL MEDICINE

## 2020-11-18 PROCEDURE — 80053 COMPREHEN METABOLIC PANEL: CPT

## 2020-11-18 PROCEDURE — 85025 COMPLETE CBC W/AUTO DIFF WBC: CPT

## 2020-11-18 PROCEDURE — 36415 COLL VENOUS BLD VENIPUNCTURE: CPT

## 2020-11-18 PROCEDURE — 6370000000 HC RX 637 (ALT 250 FOR IP): Performed by: SPECIALIST

## 2020-11-18 PROCEDURE — 94640 AIRWAY INHALATION TREATMENT: CPT

## 2020-11-18 PROCEDURE — 2060000000 HC ICU INTERMEDIATE R&B

## 2020-11-18 PROCEDURE — 6370000000 HC RX 637 (ALT 250 FOR IP): Performed by: INTERNAL MEDICINE

## 2020-11-18 RX ORDER — ZINC GLUCONATE 50 MG
50 TABLET ORAL DAILY
Qty: 30 TABLET | Refills: 0 | Status: SHIPPED | OUTPATIENT
Start: 2020-11-19

## 2020-11-18 RX ORDER — ASCORBIC ACID 500 MG
500 TABLET ORAL DAILY
Qty: 30 TABLET | Refills: 3 | Status: SHIPPED | OUTPATIENT
Start: 2020-11-19

## 2020-11-18 RX ORDER — LANOLIN ALCOHOL/MO/W.PET/CERES
100 CREAM (GRAM) TOPICAL DAILY
Qty: 30 TABLET | Refills: 0 | Status: SHIPPED | OUTPATIENT
Start: 2020-11-19

## 2020-11-18 RX ORDER — CHOLECALCIFEROL (VITAMIN D3) 50 MCG
2000 TABLET ORAL DAILY
Qty: 60 TABLET | Refills: 0 | Status: SHIPPED | OUTPATIENT
Start: 2020-11-19

## 2020-11-18 RX ADMIN — ENOXAPARIN SODIUM 40 MG: 40 INJECTION SUBCUTANEOUS at 09:08

## 2020-11-18 RX ADMIN — THIAMINE HCL TAB 100 MG 100 MG: 100 TAB at 09:08

## 2020-11-18 RX ADMIN — Medication 2000 UNITS: at 14:52

## 2020-11-18 RX ADMIN — ENOXAPARIN SODIUM 40 MG: 40 INJECTION SUBCUTANEOUS at 19:51

## 2020-11-18 RX ADMIN — MOMETASONE FUROATE AND FORMOTEROL FUMARATE DIHYDRATE 2 PUFF: 100; 5 AEROSOL RESPIRATORY (INHALATION) at 07:14

## 2020-11-18 RX ADMIN — PSEUDOEPHEDRINE HCL 240 MG: 120 TABLET, FILM COATED, EXTENDED RELEASE ORAL at 09:12

## 2020-11-18 RX ADMIN — Medication 50 MG: at 09:08

## 2020-11-18 RX ADMIN — OXYCODONE HYDROCHLORIDE AND ACETAMINOPHEN 500 MG: 500 TABLET ORAL at 09:08

## 2020-11-18 RX ADMIN — CETIRIZINE HYDROCHLORIDE 10 MG: 10 TABLET, FILM COATED ORAL at 09:12

## 2020-11-18 RX ADMIN — DEXAMETHASONE 6 MG: 4 TABLET ORAL at 11:18

## 2020-11-18 RX ADMIN — MOMETASONE FUROATE AND FORMOTEROL FUMARATE DIHYDRATE 2 PUFF: 100; 5 AEROSOL RESPIRATORY (INHALATION) at 17:34

## 2020-11-18 ASSESSMENT — PAIN SCALES - GENERAL
PAINLEVEL_OUTOF10: 0
PAINLEVEL_OUTOF10: 0

## 2020-11-18 NOTE — PROGRESS NOTES
Pt educated on prone position. Pt turns self and is able to prone as much as possible. Pt verbalized understanding and reason for ICS use, pt agreed to use ICS as much as possible.  RN will continue to monitor Pt status through the night

## 2020-11-18 NOTE — CARE COORDINATION
HEMANT Note: 11/18/2020 at 10:29am: COVID POSITIVE - test done on 11/14. CM called and talked to the patient on her phone in the room. States she is feeling better, but is tired. Currently on RA, but nurse states she is going to do a walking pulse ox test to see if the patient would qualify for home O2. If patient does qualify for home O2, will need orders with O2 liter flow, for both portability and concentrator, time frame, physicians NPI number, the date the order was written, the dg and physician documentation of why the O2 is needed. Talked to Mrs. Jackson and she states IF she would require home O2, she would select [facility]. States her plans remain to return home and her  will provide transportation.  Lotus Quezada RN

## 2020-11-18 NOTE — CARE COORDINATION
CM Note: 11/18/2020 at 3:19pm: COVID POSITIVE - test done on 11/14. Ambulatory pulse ox testing done and patient did qualify for home oxygen. Ambulatory pulse ox test faxed to [facility] at [phone]. Unsure if patient will be discharged today. If patient is discharged today, will need orders with O2 liter flow, for both portability and concentrator, time frame, physicians NPI number, the date the order was written, the diagnosis and physician documentation of why the O2 is needed - and this information will need faxed to [facility] at [phone]. If patient is discharged today,  please call [facility] at [phone] and let them know that patient will need a portable tank to be delivered to the hospital for discharge. If after 5pm, please call the same number [phone] and follow the prompts and they will connect you to the on call person and inform them that you have a patient going home today and you need a portable tank to be delivered to the hospital . Please inform the patient that once she gets home, she is to call [facility] and follow the prompts so that they can deliver the oxygen equipment to her home. I did clarify that her address and phone number were correct. Patient's nurse Ollie Lara informed.  Delores Moffett RN

## 2020-11-18 NOTE — PROGRESS NOTES
Pulse ox was 92_% on room air at rest.  Ambulated patient on room air. Oxygen saturation was _82_% on room air while ambulating. Oxygen applied. Patient was 87% on 2L NC. Oxygen increased to 3L NC. Recovery pulse ox was _94_% on 3_liters of oxygen while ambulating.

## 2020-11-18 NOTE — PROGRESS NOTES
Patient ambulated on Room air. SpO2 at rest is 92-94%. With ambulation Spo2 dropped to 79% after 200 feet. Patient was able to pause and deep breath and no oxygen was needed to be applied. Recovery Spo2 was 91% on RA.

## 2020-11-19 VITALS
OXYGEN SATURATION: 91 % | HEIGHT: 66 IN | BODY MASS INDEX: 27.32 KG/M2 | HEART RATE: 72 BPM | DIASTOLIC BLOOD PRESSURE: 65 MMHG | SYSTOLIC BLOOD PRESSURE: 105 MMHG | WEIGHT: 170 LBS | TEMPERATURE: 98.4 F | RESPIRATION RATE: 16 BRPM

## 2020-11-19 LAB
ALBUMIN SERPL-MCNC: 3.5 G/DL (ref 3.5–5.2)
ALP BLD-CCNC: 85 U/L (ref 35–104)
ALT SERPL-CCNC: 186 U/L (ref 0–32)
ANION GAP SERPL CALCULATED.3IONS-SCNC: 12 MMOL/L (ref 7–16)
AST SERPL-CCNC: 93 U/L (ref 0–31)
BILIRUB SERPL-MCNC: 0.4 MG/DL (ref 0–1.2)
BLOOD CULTURE, ROUTINE: NORMAL
BUN BLDV-MCNC: 24 MG/DL (ref 6–20)
CALCIUM SERPL-MCNC: 8.8 MG/DL (ref 8.6–10.2)
CHLORIDE BLD-SCNC: 108 MMOL/L (ref 98–107)
CO2: 22 MMOL/L (ref 22–29)
CREAT SERPL-MCNC: 0.7 MG/DL (ref 0.5–1)
GFR AFRICAN AMERICAN: >60
GFR NON-AFRICAN AMERICAN: >60 ML/MIN/1.73
GLUCOSE BLD-MCNC: 185 MG/DL (ref 74–99)
IGE: 17 KU/L
POTASSIUM SERPL-SCNC: 3.4 MMOL/L (ref 3.5–5)
SODIUM BLD-SCNC: 142 MMOL/L (ref 132–146)
TOTAL PROTEIN: 6.3 G/DL (ref 6.4–8.3)

## 2020-11-19 PROCEDURE — 80053 COMPREHEN METABOLIC PANEL: CPT

## 2020-11-19 PROCEDURE — 6370000000 HC RX 637 (ALT 250 FOR IP): Performed by: SPECIALIST

## 2020-11-19 PROCEDURE — 6370000000 HC RX 637 (ALT 250 FOR IP): Performed by: INTERNAL MEDICINE

## 2020-11-19 PROCEDURE — 6360000002 HC RX W HCPCS: Performed by: INTERNAL MEDICINE

## 2020-11-19 PROCEDURE — 36415 COLL VENOUS BLD VENIPUNCTURE: CPT

## 2020-11-19 PROCEDURE — 94640 AIRWAY INHALATION TREATMENT: CPT

## 2020-11-19 RX ORDER — DEXAMETHASONE 6 MG/1
6 TABLET ORAL DAILY
Qty: 5 TABLET | Refills: 0 | Status: SHIPPED | OUTPATIENT
Start: 2020-11-20 | End: 2020-11-25

## 2020-11-19 RX ORDER — POTASSIUM BICARBONATE 25 MEQ/1
50 TABLET, EFFERVESCENT ORAL ONCE
Status: DISCONTINUED | OUTPATIENT
Start: 2020-11-19 | End: 2020-11-19 | Stop reason: CLARIF

## 2020-11-19 RX ADMIN — ENOXAPARIN SODIUM 40 MG: 40 INJECTION SUBCUTANEOUS at 09:11

## 2020-11-19 RX ADMIN — MOMETASONE FUROATE AND FORMOTEROL FUMARATE DIHYDRATE 2 PUFF: 100; 5 AEROSOL RESPIRATORY (INHALATION) at 06:20

## 2020-11-19 RX ADMIN — Medication 50 MG: at 09:08

## 2020-11-19 RX ADMIN — OXYCODONE HYDROCHLORIDE AND ACETAMINOPHEN 500 MG: 500 TABLET ORAL at 09:08

## 2020-11-19 RX ADMIN — DEXAMETHASONE 6 MG: 4 TABLET ORAL at 12:12

## 2020-11-19 RX ADMIN — POTASSIUM BICARBONATE 40 MEQ: 782 TABLET, EFFERVESCENT ORAL at 14:10

## 2020-11-19 RX ADMIN — THIAMINE HCL TAB 100 MG 100 MG: 100 TAB at 09:09

## 2020-11-19 RX ADMIN — Medication 2000 UNITS: at 09:08

## 2020-11-19 ASSESSMENT — PAIN SCALES - GENERAL
PAINLEVEL_OUTOF10: 0
PAINLEVEL_OUTOF10: 0

## 2020-11-19 NOTE — DISCHARGE INSTR - DIET
 General diet   Good nutrition is important when healing from an illness, injury, or surgery. Follow any nutrition recommendations given to you during your hospital stay.  If you were given an oral nutrition supplement while in the hospital, continue to take this supplement at home. You can take it with meals, in-between meals, and/or before bedtime. These supplements can be purchased at most local grocery stores, pharmacies, and chain super-stores.  If you have any questions about your diet or nutrition, call the hospital and ask for the dietitian.

## 2020-11-19 NOTE — PROGRESS NOTES
[facility] PROGRESS NOTE    F/u SARS-COV-2 infection FACE TO FACE    SUBJECTIVE:  Vaishali Jackson, 51 y.o., female     Pt was discussed with care team  Pt ambulated and hadd ec o2 sat  On ra low 90 %  She feesl good    ROS:GENERAL-             GENERAL:Temperature:  Current - Temp: 97.1 °F (36.2 °C);  Max - Temp  Av.3 °F (36.3 °C)  Min: 97.1 °F (36.2 °C)  Max: 97.5 °F (36.4 °C)  Respiratory Rate : Resp  Av.7  Min: 16  Max: 18  Pulse Range: Pulse  Av.8  Min: 50  Max: 73  Blood Pressure Range:  Systolic (27TWY), CHF:190 , Min:110 , XMT:722   ; Diastolic (28MVZ), BYE:69, Min:66, Max:70    Pulse ox Range: SpO2  Av.7 %  Min: 91 %  Max: 93 %  24hr I & O:      Intake/Output Summary (Last 24 hours) at 2020 1937  Last data filed at 2020 0830  Gross per 24 hour   Intake 480 ml   Output --   Net 480 ml       CONSTITUTIONAL:   Awake, alert  HEENT:    AT/NC  LUNGS:   Few crackles post b no wheeze   CARDIOVASCULAR:   S1 and S2   ABDOMEN:     Normal bowel sounds, non-distended    EXTREMITIES:   FROM  No edema  SKIN:     NO RASH   CNS:    NAD NO DEFICITS  PSYCH:   Pleasant     MEDS:  dexamethasone (DECADRON) tablet 6 mg, Daily  Vitamin D (CHOLECALCIFEROL) tablet 2,000 Units, Daily  cetirizine (ZYRTEC) tablet 10 mg, Daily PRN    And  pseudoephedrine (SUDAFED 12 HR) extended release tablet 240 mg, Daily PRN  mometasone-formoterol (DULERA) 100-5 MCG/ACT inhaler 2 puff, BID  traMADol (ULTRAM) tablet 100 mg, Q6H PRN  vitamin B-1 (THIAMINE) tablet 100 mg, Daily  enoxaparin (LOVENOX) injection 40 mg, BID  zinc gluconate tablet 50 mg, Daily  vitamin C (ASCORBIC ACID) tablet 500 mg, Daily  acetaminophen (TYLENOL) tablet 650 mg, Q4H PRN  cefTRIAXone (ROCEPHIN) 2 g in sterile water 20 mL IV syringe, Q24H  azithromycin (ZITHROMAX) 500 mg in D5W 250ml Vial Mate, Q24H  0.9 % sodium chloride bolus, PRN          Data:  Lab Results   Component Value Date    COVID19 DETECTED 11/14/2020     COVID-19/SARS-COV-2 LABS  Recent Labs     11/16/20  0700 11/17/20  0634 11/18/20  0809   AST 65* 40* 206*   ALT 81* 63* 200*   TRIG 101  --   --      Lab Results   Component Value Date    CHOL 127 11/16/2020    TRIG 101 11/16/2020    HDL 31 11/16/2020    LDLCALC 76 11/16/2020    LABVLDL 20 11/16/2020     Lab Results   Component Value Date/Time    VITD25 26 (L) 11/15/2020 07:58 AM     Recent Labs     11/16/20  0700 11/18/20  0809   WBC 5.1 4.0*   HGB 12.0 12.4   HCT 36.6 36.6    206   MCV 89.7 86.5   MCH 29.4 29.3   MCHC 32.8 33.9   RDW 12.4 12.3   LYMPHOPCT 15.0* 26.0   MONOPCT 7.7 13.0*   BASOPCT 0.0 0.3   MONOSABS 0.39 0.52   LYMPHSABS 0.76* 1.04*   EOSABS 0.00* 0.00*   BASOSABS 0.00 0.01     Recent Labs     11/16/20  0700 11/17/20  0634 11/18/20  0809    140 144   K 3.9 3.4* 3.7   * 108* 111*   CO2 22 24 23   BUN 16 20 19   CREATININE 0.7 0.7 0.7   GFRAA >60 >60 >60   LABGLOM >60 >60 >60   GLUCOSE 116* 90 95   PROT 6.1* 5.9* 6.2*   LABALBU 3.4* 3.4* 3.4*   CALCIUM 8.2* 8.3* 8.3*   BILITOT 0.2 0.3 0.3   ALKPHOS 93 88 87   AST 65* 40* 206*   ALT 81* 63* 200*     U/A:    Lab Results   Component Value Date    COLORU Yellow 06/30/2016    PROTEINU Negative 06/30/2016    PHUR 5.5 06/30/2016    CLARITYU Clear 06/30/2016    SPECGRAV >=1.030 06/30/2016    LEUKOCYTESUR Negative 06/30/2016    UROBILINOGEN 0.2 06/30/2016    BILIRUBINUR Negative 06/30/2016    BLOODU Negative 06/30/2016    GLUCOSEU Negative 06/30/2016        MICRO  Blood cultures   Blood Culture, Routine   Date Value Ref Range Status   11/14/2020 24 Hours no growth  Preliminary       ASSESSMENT:    Active Hospital Problems    Diagnosis Date Noted    COVID-19 [U07.1] 11/14/2020    Hyperlipidemia [E78.5]     Migraine without aura [G43.009] 01/20/2015    Malignant neoplasm of female breast ([facility] [address] [C50.919] 12/18/2006       SARS-COV-2- positive with pneumonia  ON RA TODAY   LEUKOPENIA RESOLVED  · Remdesivir  DAY 4 inc

## 2020-11-19 NOTE — DISCHARGE SUMMARY
Discharge Summary    Kathy Cook  :  1970  MRN:  [id number]    Admit date:  2020  Discharge date:  2020    Admitting Physician:    Ilda Renae MD    Discharge Diagnoses:    Principal Problem:    COVID-19  Active Problems:    Malignant neoplasm of female breast ([address]    Migraine without aura    Hyperlipidemia  Resolved Problems:    * No resolved hospital problems. *    Consults:   IP CONSULT TO INFECTIOUS DISEASES  IP CONSULT TO PHARMACY     Condition at Discharge:  Stable    HPI/Hospital Course: 51 y.o. female who presents with coughing along with shortness of breath worsening over the past 3 to 4 days. On the day of admission patient's pulse ox was in the mid to high 80% range at rest and with any exertion so she was advised to come to the emergency room. She did have associated low-grade temperatures but no nausea, vomiting, diarrhea or myalgias. She is experiencing loss of taste and smell though. CT chest found groundglass opacities consistent with Covid pneumonia. Respiratory swab confirmed patient was positive for Covid requiring admission to the Covid unit for further treatment. Pt improved with remdesvir, steroids, inhalers, vitamins and 02. Today on day of discharge pt feels better with no further complaints. Vitals and Labs are stable including 02 but pt still requiring 02 with ambulation:  Pulse ox was 92_% on room air at rest.  Ambulated patient on room air. Oxygen saturation was _82_% on room air while ambulating. Oxygen applied. Patient was 87% on 2L NC. Oxygen increased to 3L NC. Recovery pulse ox was _94_% on 3_liters of oxygen while ambulating. All consultants involved during this admission are agreeable to d/c.     Physical Exam  /65   Pulse 72   Temp 98.4 °F (36.9 °C) (Infrared)   Resp 16   Ht 5' 6\" (1.676 m)   Wt 170 lb (77.1 kg)   LMP 2013   SpO2 91%   BMI 27.44 kg/m²   RRR   no wheeze, rales or rhonchi   bowel sounds present, nontender, nondistended   No clubbing, cyanosis, or edema   Neuro - at baseline     Pertinent Results during this Hospital Course:  Cta Chest W Contrast    Result Date: 11/14/2020  EXAMINATION: CTA OF THE CHEST 11/14/2020 5:45 pm TECHNIQUE: CTA of the chest was performed after the administration of intravenous contrast.  Multiplanar reformatted images are provided for review. MIP images are provided for review. Dose modulation, iterative reconstruction, and/or weight based adjustment of the mA/kV was utilized to reduce the radiation dose to as low as reasonably achievable. COMPARISON: CT chest from November 12, 2015 HISTORY: ORDERING SYSTEM PROVIDED HISTORY: hypoxia. History of breast cancer TECHNOLOGIST PROVIDED HISTORY: Reason for exam:->hypoxia. History of breast cancer FINDINGS: Pulmonary Arteries: Adequate opacification of the pulmonary arterial system to exclude central and segmental pulmonary emboli. Mediastinum: No evidence of mediastinal lymphadenopathy. The heart and pericardium demonstrate no acute abnormality. There is no acute abnormality of the thoracic aorta. Normal course and appearance of the esophagus. Lungs/pleura: Small right tracheal diverticulum series 4, image 18. The airway otherwise is patent. Multiple bilateral predominantly peripheral nodular opacities in the bilateral lungs slightly more pronounced in the bilateral lower lungs. No pleural effusion or pneumothorax. Upper Abdomen: Limited images of the upper abdomen are unremarkable. Soft Tissues/Bones: The thyroid gland and remaining soft tissue structures of the neck appear unremarkable. Patient has undergone left mastectomy. Vertebral body height and alignment is maintained. Multilevel endplate spondylosis. 1.  No evidence of central or segmental pulmonary emboli in the pulmonary arterial system. 2.  There are patchy bilateral diffuse ground-glass opacities most pronounced at the periphery and lower lungs.  Commonly reported imaging features of COVID-19 pneumonia are present. Other processes such as influenza pneumonia and organizing pneumonia, as can be seen with drug toxicity and connective tissue disease, can cause a similar imaging pattern. A follow-up chest CT 3-6 months after resolution of patient's symptoms suggested to reassess lung parenchyma. 3.  Status post left mastectomy. No evidence of destructive lytic or blastic abnormality. Lab Results   Component Value Date    WBC 4.0 11/18/2020    HGB 12.4 11/18/2020    HCT 36.6 11/18/2020    MCV 86.5 11/18/2020     11/18/2020     11/19/2020    K 3.4 11/19/2020    K 4.5 11/14/2020     11/19/2020    CO2 22 11/19/2020    BUN 24 11/19/2020    CREATININE 0.7 11/19/2020    CALCIUM 8.8 11/19/2020    ALKPHOS 85 11/19/2020     11/19/2020    AST 93 11/19/2020    BILITOT 0.4 11/19/2020    LABALBU 3.5 11/19/2020    LDLCALC 76 11/16/2020    TRIG 101 11/16/2020     Lab Results   Component Value Date    INR 1.1 11/14/2020     Discharge Medications:    Current Discharge Medication List           Details   dexamethasone (DECADRON) 6 MG tablet Take 1 tablet by mouth daily for 5 days  Qty: 5 tablet, Refills: 0      vitamin C (VITAMIN C) 500 MG tablet Take 1 tablet by mouth daily  Qty: 30 tablet, Refills: 3      Vitamin D (CHOLECALCIFEROL) 50 MCG (2000 UT) TABS tablet Take 1 tablet by mouth daily  Qty: 60 tablet, Refills: 0    Comments: Labeling may look different. 25 mcg=1000 Units. Please double check dosages.       zinc gluconate 50 MG tablet Take 1 tablet by mouth daily  Qty: 30 tablet, Refills: 0      vitamin B-1 100 MG tablet Take 1 tablet by mouth daily  Qty: 30 tablet, Refills: 0              Details   SUMAtriptan (IMITREX) 100 MG tablet Take 1 tablet by mouth every 12 hours as needed for Migraine  Qty: 9 tablet, Refills: 0                        Current Discharge Medication List      STOP taking these medications       LIPITOR 10 MG tablet Comments:   Reason for Stopping:             Disposition:   Home on steroids/vitamins and 02 with ambulation    Follow-up with Dr Buster Noonan in the next 1-2 weeks    Note that over 30 minutes was spent in preparing discharge papers, discussing discharge with patient, nursing and ancillary staff, medication review, etc.    Signed:  Matilda Lester MD  11/19/2020, 12:55 PM

## 2020-11-19 NOTE — PROGRESS NOTES
Subjective:  Vaishali was seen and examined at bedside today. The patient's questions were answered and tests were reviewed. There were no new problems reported overnight. Patient is currently tolerating diet  Denies any other complaints  Continues to improve - still off of 02 at rest but requiring with activity  lfts went up though today    A complete review of systems and social history was completed on admission and remains unchanged unless otherwise noted    Scheduled Meds:   dexamethasone  6 mg Oral Daily    Vitamin D  2,000 Units Oral Daily    mometasone-formoterol  2 puff Inhalation BID    vitamin B-1  100 mg Oral Daily    enoxaparin  40 mg Subcutaneous BID    zinc gluconate  50 mg Oral Daily    vitamin C  500 mg Oral Daily     Continuous Infusions:    PRN Meds:cetirizine **AND** pseudoephedrine, traMADol, acetaminophen, sodium chloride    Objective:  BP (!) 101/56   Pulse 60   Temp 98.2 °F (36.8 °C) (Infrared)   Resp 16   Ht 5' 6\" (1.676 m)   Wt 170 lb (77.1 kg)   LMP 11/14/2013   SpO2 91%   BMI 27.44 kg/m²   In: 480 [P.O.:480]  Out: -    In: 480   Out: -      AAO x 3, currently in NAD  tachy, pos S1, S2  Improved with no wheeze, rales or rhonchi  bowel sounds present, nontender, nondistended  No clubbing, cyanosis, or edema  No neuro changes   No obvious rashes or lesions. Recent Labs     11/16/20  0700 11/18/20  0809   WBC 5.1 4.0*   HGB 12.0 12.4    206     Recent Labs     11/16/20  0700 11/17/20  0634 11/18/20  0809    140 144   K 3.9 3.4* 3.7   * 108* 111*   CO2 22 24 23   BUN 16 20 19   CREATININE 0.7 0.7 0.7   GLUCOSE 116* 90 95     Recent Labs     11/16/20  0700 11/17/20  0634 11/18/20  0809   BILITOT 0.2 0.3 0.3   ALKPHOS 93 88 87   AST 65* 40* 206*   ALT 81* 63* 200*     No results for input(s): INR in the last 72 hours. Invalid input(s): PT  No results for input(s): CKTOTAL, CKMB, CKMBINDEX, TROPONINI in the last 72 hours.       [address] Contrast    Result Date: 11/14/2020  EXAMINATION: CTA OF THE CHEST 11/14/2020 5:45 pm TECHNIQUE: CTA of the chest was performed after the administration of intravenous contrast.  Multiplanar reformatted images are provided for review. MIP images are provided for review. Dose modulation, iterative reconstruction, and/or weight based adjustment of the mA/kV was utilized to reduce the radiation dose to as low as reasonably achievable. COMPARISON: CT chest from November 12, 2015 HISTORY: ORDERING SYSTEM PROVIDED HISTORY: hypoxia. History of breast cancer TECHNOLOGIST PROVIDED HISTORY: Reason for exam:->hypoxia. History of breast cancer FINDINGS: Pulmonary Arteries: Adequate opacification of the pulmonary arterial system to exclude central and segmental pulmonary emboli. Mediastinum: No evidence of mediastinal lymphadenopathy. The heart and pericardium demonstrate no acute abnormality. There is no acute abnormality of the thoracic aorta. Normal course and appearance of the esophagus. Lungs/pleura: Small right tracheal diverticulum series 4, image 18. The airway otherwise is patent. Multiple bilateral predominantly peripheral nodular opacities in the bilateral lungs slightly more pronounced in the bilateral lower lungs. No pleural effusion or pneumothorax. Upper Abdomen: Limited images of the upper abdomen are unremarkable. Soft Tissues/Bones: The thyroid gland and remaining soft tissue structures of the neck appear unremarkable. Patient has undergone left mastectomy. Vertebral body height and alignment is maintained. Multilevel endplate spondylosis. 1.  No evidence of central or segmental pulmonary emboli in the pulmonary arterial system. 2.  There are patchy bilateral diffuse ground-glass opacities most pronounced at the periphery and lower lungs. Commonly reported imaging features of COVID-19 pneumonia are present.   Other processes such as influenza pneumonia and organizing pneumonia, as can be seen with drug toxicity and connective tissue disease, can cause a similar imaging pattern. A follow-up chest CT 3-6 months after resolution of patient's symptoms suggested to reassess lung parenchyma. 3.  Status post left mastectomy. No evidence of destructive lytic or blastic abnormality. Assessment:  Shabnam Whiting is a 51y.o. year old female who presented on 11/14/2020 and is being treated for:  Principal Problem:    COVID-19  Active Problems:    Malignant neoplasm of female breast ([address]    Migraine without aura    Hyperlipidemia  Resolved Problems:    * No resolved hospital problems.  *    Plan             · Repeat lfts in am - remdesivir dose held today - also dc on hold today  · continue current supportive treatments including oxygen, steroids, Lovenox and cough meds  · Ambulate patient and cont to wean off O2 as tolerated  · Possible dc home tomorrow if lfts stable - with or without 02    Nabor Rosenthal MD  5:46 PM  11/18/2020

## 2020-11-19 NOTE — PROGRESS NOTES
Physician Progress Note      [facility]  CSN #:                  [id number]  :                       1970  ADMIT DATE:       2020 3:12 PM  [address] [place] DATE:  RESPONDING  PROVIDER #:        Tete Hartman MD          QUERY TEXT:    Dear Dr. Mary Wilburn,    Pt admitted with SARS-COV-2 pneumonia. Pt noted to have hypoxia. If   possible, please document in the progress notes and discharge summary if you   are evaluating and/or treating any of the following: The medical record reflects the following:  Risk Factors: hx breast cancer  Clinical Indicators: per ED: \"pulse ox at home and has been monitoring her O2   levels. They've been dropping to around 86%. .. Vital signs are remarkable for   hypoxia. Patient dips into the mid 80s. Patient placed on 2 L nasal cannula   and is now at 99%\"; per pn 11/15: \"Her breathing remains labored at rest and   with any activity\"  Treatment: IMCU monitoring, oxygen supplementation, Antionette FRYE    Thank You,  Debra Jaquez RN, BSN, CDS  Clinical Documentation Improvement Specialist  [phone]  Options provided:  -- Acute respiratory failure with hypoxia  -- Acute respiratory failure with hypercapnia  -- Other - I will add my own diagnosis  -- Disagree - Not applicable / Not valid  -- Disagree - Clinically unable to determine / Unknown  -- Refer to Clinical Documentation Reviewer    PROVIDER RESPONSE TEXT:    This patient is in acute respiratory failure with hypoxia.     Query created by: Kristofer Belle on 2020 10:58 AM      Electronically signed by:  Tete Hartman MD 2020 9:01 PM

## 2020-11-19 NOTE — PLAN OF CARE
Problem: Airway Clearance - Ineffective  Goal: Achieve or maintain patent airway  Outcome: Completed     Problem: Gas Exchange - Impaired  Goal: Absence of hypoxia  Outcome: Completed  Goal: Promote optimal lung function  Outcome: Completed     Problem: Breathing Pattern - Ineffective  Goal: Ability to achieve and maintain a regular respiratory rate  Outcome: Completed     Problem:  Body Temperature -  Risk of, Imbalanced  Goal: Ability to maintain a body temperature within defined limits  Outcome: Completed  Goal: Will regain or maintain usual level of consciousness  Outcome: Completed  Goal: Complications related to the disease process, condition or treatment will be avoided or minimized  Outcome: Completed     Problem: Isolation Precautions - Risk of Spread of Infection  Goal: Prevent transmission of infection  Outcome: Completed     Problem: Nutrition Deficits  Goal: Optimize nutrtional status  Outcome: Completed     Problem: Risk for Fluid Volume Deficit  Goal: Maintain normal heart rhythm  Outcome: Completed  Goal: Maintain absence of muscle cramping  Outcome: Completed  Goal: Maintain normal serum potassium, sodium, calcium, phosphorus, and pH  Outcome: Completed     Problem: Loneliness or Risk for Loneliness  Goal: Demonstrate positive use of time alone when socialization is not possible  Outcome: Completed     Problem: Fatigue  Goal: Verbalize increase energy and improved vitality  Outcome: Completed     Problem: Patient Education: Go to Patient Education Activity  Goal: Patient/Family Education  Outcome: Completed     Problem: Pain:  Goal: Pain level will decrease  Description: Pain level will decrease  Outcome: Completed  Goal: Control of acute pain  Description: Control of acute pain  Outcome: Completed  Goal: Control of chronic pain  Description: Control of chronic pain  Outcome: Completed

## 2020-11-20 ENCOUNTER — CARE COORDINATION (OUTPATIENT)
Dept: CASE MANAGEMENT | Age: 50
End: 2020-11-20

## 2020-11-20 LAB — CULTURE, BLOOD 2: NORMAL

## 2020-11-20 NOTE — CARE COORDINATION
[facility] Transitions Initial Follow Up Call    Call within 2 business days of discharge: Yes    Patient: Nicolás Avila Patient : 1970   MRN: [id number]  Reason for Admission: COVID-19  Discharge Date: 20 RARS: Readmission Risk Score: 13      Last Discharge [facility]       Complaint Diagnosis Description Type Department Provider    20 Influenza COVID-19 ED to Hosp-Admission (Discharged) (ADMITTED) Nick Foreman MD; Ivonne Martinez. .. Challenges to be reviewed by the provider   Additional needs identified to be addressed with provider No  none    Discussed COVID-19 related testing which was available at this time. Test results were positive. Patient informed of results, if available? Yes         Method of communication with provider : none    Advance Care Planning:   Does patient have an Advance Directive:  decision maker updated. Was this a readmission? No  Patient stated reason for admission: short of breath  Patients top risk factors for readmission: medical condition    Care Transition Nurse (CTN) contacted the patient by telephone to perform post hospital discharge assessment. Verified name and  with patient as identifiers. Provided introduction to self, and explanation of the CTN role. CTN reviewed discharge instructions, medical action plan and red flags with patient who verbalized understanding. Patient given an opportunity to ask questions and does not have any further questions or concerns at this time. Were discharge instructions available to patient? Yes. Reviewed appropriate site of care based on symptoms and resources available to patient including: PCP, Urgent care clinics and When to call 911. The patient agrees to contact the PCP office for questions related to their healthcare. Medication reconciliation was performed with patient, who verbalizes understanding of administration of home medications.  Advised obtaining a 90-day supply of all daily and as-needed medications. Covid Risk Education    Patient has following risk factors of: History of breast cancer 14 years ago. Education provided regarding infection prevention, and signs and symptoms of COVID-19 and when to seek medical attention with patient who verbalized understanding. Discussed exposure protocols and quarantine From CDC: Are you at higher risk for severe illness?   and given an opportunity for questions and concerns. The patient agrees to contact the COVID-19 hotline 501-338-5819 or PCP office for questions related to COVID-19. For more information on steps you can take to protect yourself, see CDC's How to Protect Yourself     Patient/family/caregiver given information for [facility] and agrees to enroll yes  Patient's preferred e-mail: [place]  Patient's preferred phone number: [phone]    Discussed follow-up appointments. If no appointment was previously scheduled, appointment scheduling offered: Yes. Is follow up appointment scheduled within 7 days of discharge? No, CTN confirmed with patient she will call and follow up with Dr. Agustin Clemons (PCP) and inquire about telephone/vv follow up. CTN advised patient she can follow up with Sushila SWEET ([facility]) if needed and for retesting too. Non-face-to-face services provided:  Scheduled appointment with PCP-CTN confirmed with patient she will call and schedule follow up with PCP and inquire about telephone/vv option d/t COVID-19 posiitve  Obtained and reviewed discharge summary and/or continuity of care documents  Education of patient/family/caregiver/guardian to support self-management-CTN discussed COVIF-19 precautions and knowing when to seek medical attention.    Assessment and support for treatment adherence and medication management-CTN advised to take Decadron as directed until completely finished    Care Transitions 24 Hour Call    Schedule Follow Up Appointment with PCP:  Declined  Do you have any ongoing symptoms?:  No  Do you have a copy of your discharge instructions?:  Yes  Do you have all of your prescriptions and are they filled?:  Yes  Have you been contacted by a [address]?:  No  Have you scheduled your follow up appointment?:  No  Were you discharged with any Home Care or Post Acute Services:  No  Do you feel like you have everything you need to keep you well at home?:  Yes  Care Transitions Interventions       Spoke with patient today 11/20/20 for hospital discharge/COVI-19 positive follow up. Jennifer reports she is feeling better since discharge an offers no complaints at this time. States she continues with productive cough which is improving. States producing yellow colored phlegm. Denies any shortness, chest pain, nausea, vomiting, diarrhea, chills or fever. States wearing home oxygen 2 lpm as needed such as when up walking. Noted patient tested positive for COVID-19 on 11/14/20. Confirmed with patient she obtained new meds ordered on discharge. Advised to take Decadron as directed until completely finished.,     Discussed COVID-19 precautions and knowing when to seek medical attention. States she will call PCP to follow up and inquire about telephone/vv follow up option. Advised she can follow up at [facility] (red clinic) if needed and can get retested there too. Denies any complaints or concerns at this time. Patient enrolled into Loop Program for continued monitoring. Follow Up  No future appointments. CTN provided contact information for future needs.     ALEKSANDAR Palacios

## 2022-04-22 NOTE — PROGRESS NOTES
OFFICE PROGRESS NOTE      SUBJECTIVE:        Patient ID:   Jonh Mims is a 56 y.o. female who presents for   Chief Complaint   Patient presents with    Headache     migraine    Annual Exam           HPI:     Patient gets migraine headaches  Imitrex's help  Lab work reviewed  LDL is high LD      Prior to Admission medications    Medication Sig Start Date End Date Taking? Authorizing Provider   LIPITOR 10 MG tablet Take 1 tablet by mouth daily 7/6/18  Yes Nick Collazo MD   SUMAtriptan (IMITREX) 100 MG tablet Take 1 tablet by mouth every 12 hours as needed for Migraine 6/8/18  Yes Nick Collazo MD     Social History     Social History    Marital status:      Spouse name: N/A    Number of children: N/A    Years of education: N/A     Social History Main Topics    Smoking status: Never Smoker    Smokeless tobacco: Never Used    Alcohol use Yes      Comment: few glassed of wine a week    Drug use: No    Sexual activity: Not Asked     Other Topics Concern    None     Social History Narrative    None       I have reviewed Vaishali's allergies, medications, problem list, medical, social and family history and have updated as needed in the electronic medical record  Review Of Systems:    Review of Systems   Constitutional: Negative for chills and fever. HENT: Negative for congestion and ear pain. Eyes: Negative for blurred vision and discharge. Respiratory: Negative for hemoptysis and shortness of breath (No new SOB). Cardiovascular: Negative for chest pain, orthopnea and leg swelling. Gastrointestinal: Negative for abdominal pain, blood in stool and nausea. Genitourinary: Negative for flank pain and hematuria. Musculoskeletal: Negative for myalgias and neck pain. Skin: Negative for rash. Neurological: Positive for headaches. Negative for dizziness, focal weakness and seizures. Endo/Heme/Allergies: Negative for polydipsia.  Does not bruise/bleed easily. Psychiatric/Behavioral: Negative for depression, hallucinations and suicidal ideas. OBJECTIVE:     VS:  Wt Readings from Last 3 Encounters:   07/06/18 173 lb (78.5 kg)   05/09/17 175 lb (79.4 kg)   06/28/13 163 lb (73.9 kg)     Temp Readings from Last 3 Encounters:   07/06/18 98.4 °F (36.9 °C) (Oral)   06/28/13 97.8 °F (36.6 °C) (Oral)   06/18/13 98.1 °F (36.7 °C)     BP Readings from Last 3 Encounters:   07/06/18 126/72   06/23/15 110/70   06/28/13 110/70        Physical Exam   Constitutional: She is oriented to person, place, and time. She appears well-developed and well-nourished. HENT:   Head: Normocephalic and atraumatic. Mouth/Throat: Oropharynx is clear and moist.   Eyes: Conjunctivae are normal. Pupils are equal, round, and reactive to light. Neck: Normal range of motion. Neck supple. Cardiovascular: Normal rate, regular rhythm, normal heart sounds and intact distal pulses. Pulmonary/Chest: Effort normal and breath sounds normal.   Abdominal: Soft. Bowel sounds are normal.   Musculoskeletal: Normal range of motion. Neurological: She is alert and oriented to person, place, and time. Skin: Skin is warm and dry.    Psychiatric: Thought content normal.          Labs :    Lab Results   Component Value Date    WBC 5.2 07/06/2018    HGB 14.5 07/06/2018    HCT 42.9 07/06/2018     07/06/2018    CHOL 209 (H) 07/06/2018    TRIG 155 (H) 07/06/2018    HDL 48 07/06/2018    ALT 14 07/06/2018    AST 15 07/06/2018     07/06/2018    K 4.2 07/06/2018     07/06/2018    CREATININE 0.9 07/06/2018    BUN 18 07/06/2018    CO2 27 07/06/2018    TSH 2.730 07/06/2018     Lab Results   Component Value Date    COLORU Yellow 06/30/2016    NITRU Negative 06/30/2016    GLUCOSEU Negative 06/30/2016    KETUA Negative 06/30/2016    UROBILINOGEN 0.2 06/30/2016    BILIRUBINUR Negative 06/30/2016     Lab Results   Component Value Date     66.0 07/10/2015         Controlled Substances Monitoring:                                    ASSESSMENT     Patient Active Problem List    Diagnosis Date Noted    Primary malignant neoplasm of female breast ([facility] 07/06/2018    Female genital symptoms 07/06/2018    Migraine without aura 01/20/2015    Malignant neoplasm of female breast ([facility] 12/18/2006    Malignant neoplasm of lower-inner quadrant of female breast ([facility] 11/16/2006    Malignant neoplasm of upper-outer quadrant of female breast ([facility] 11/16/2006        Diagnosis:     ICD-10-CM ICD-9-CM    1. Migraine without status migrainosus, not intractable, unspecified migraine type G43.909 346.90 CBC   2. Mixed hyperlipidemia E78.2 272.2 CBC      COMPREHENSIVE METABOLIC PANEL      LIPID PANEL       PLAN:   Lipitor 10 mg daily  Low-sodium low-fat diet  Discontinue alcohol  Regular exercise program        Patient Instructions   Take the medication as prescribed  Low-sodium low-fat diet  Regular exercises  Discontinue alcohol      Return in about 6 weeks (around 8/17/2018). I have reviewed my findings and recommendations with Vaishali Jackson.     Electronically signed by Jesus Moreno MD on 7/6/18 at 5:00 PM Attending Attestation (For Attendings USE Only)...

## 2025-06-26 ENCOUNTER — HOSPITAL ENCOUNTER (OUTPATIENT)
Age: 55
Discharge: HOME | End: 2025-06-28

## 2025-06-26 DIAGNOSIS — R31.9 URINARY TRACT INFECTION WITH HEMATURIA, SITE UNSPECIFIED: Primary | ICD-10-CM

## 2025-06-26 DIAGNOSIS — N39.0 URINARY TRACT INFECTION WITH HEMATURIA, SITE UNSPECIFIED: Primary | ICD-10-CM

## 2025-06-26 LAB
BILIRUB UR QL STRIP: NEGATIVE
CLARITY UR: CLEAR
COLOR UR: YELLOW
COMMENT: ABNORMAL
GLUCOSE UR STRIP-MCNC: NEGATIVE MG/DL
HGB UR QL STRIP.AUTO: NEGATIVE
KETONES UR STRIP-MCNC: NEGATIVE MG/DL
LEUKOCYTE ESTERASE UR QL STRIP: NEGATIVE
NITRITE UR QL STRIP: NEGATIVE
PH UR STRIP: 5.5 [PH] (ref 5–8)
PROT UR STRIP-MCNC: NEGATIVE MG/DL
SP GR UR STRIP: >1.03 (ref 1–1.03)
UROBILINOGEN UR STRIP-ACNC: 0.2 EU/DL (ref 0–1)

## 2025-06-26 PROCEDURE — 81003 URINALYSIS AUTO W/O SCOPE: CPT
